# Patient Record
Sex: FEMALE | Race: WHITE | NOT HISPANIC OR LATINO | Employment: UNEMPLOYED | URBAN - METROPOLITAN AREA
[De-identification: names, ages, dates, MRNs, and addresses within clinical notes are randomized per-mention and may not be internally consistent; named-entity substitution may affect disease eponyms.]

---

## 2023-06-30 ENCOUNTER — OFFICE VISIT (OUTPATIENT)
Dept: URGENT CARE | Facility: CLINIC | Age: 43
End: 2023-06-30
Payer: MEDICAID

## 2023-06-30 VITALS
SYSTOLIC BLOOD PRESSURE: 122 MMHG | HEART RATE: 92 BPM | DIASTOLIC BLOOD PRESSURE: 76 MMHG | HEIGHT: 61 IN | OXYGEN SATURATION: 99 % | WEIGHT: 120 LBS | RESPIRATION RATE: 18 BRPM | TEMPERATURE: 99 F | BODY MASS INDEX: 22.66 KG/M2

## 2023-06-30 DIAGNOSIS — H66.92 LEFT OTITIS MEDIA, UNSPECIFIED OTITIS MEDIA TYPE: ICD-10-CM

## 2023-06-30 DIAGNOSIS — H61.23 BILATERAL IMPACTED CERUMEN: Primary | ICD-10-CM

## 2023-06-30 PROCEDURE — 99204 OFFICE O/P NEW MOD 45 MIN: CPT | Mod: 25,S$GLB,, | Performed by: NURSE PRACTITIONER

## 2023-06-30 PROCEDURE — 69210 PR REMOVAL IMPACTED CERUMEN REQUIRING INSTRUMENTATION, UNILATERAL: ICD-10-PCS | Mod: S$GLB,,, | Performed by: NURSE PRACTITIONER

## 2023-06-30 PROCEDURE — 69210 REMOVE IMPACTED EAR WAX UNI: CPT | Mod: S$GLB,,, | Performed by: NURSE PRACTITIONER

## 2023-06-30 PROCEDURE — 99204 PR OFFICE/OUTPT VISIT, NEW, LEVL IV, 45-59 MIN: ICD-10-PCS | Mod: 25,S$GLB,, | Performed by: NURSE PRACTITIONER

## 2023-06-30 RX ORDER — AMOXICILLIN AND CLAVULANATE POTASSIUM 875; 125 MG/1; MG/1
1 TABLET, FILM COATED ORAL 2 TIMES DAILY
Qty: 20 TABLET | Refills: 0 | Status: SHIPPED | OUTPATIENT
Start: 2023-06-30 | End: 2023-06-30 | Stop reason: CLARIF

## 2023-06-30 RX ORDER — NEOMYCIN SULFATE, POLYMYXIN B SULFATE, HYDROCORTISONE 3.5; 10000; 1 MG/ML; [USP'U]/ML; MG/ML
3 SOLUTION/ DROPS AURICULAR (OTIC) 3 TIMES DAILY
Qty: 10 ML | Refills: 0 | Status: SHIPPED | OUTPATIENT
Start: 2023-06-30 | End: 2023-07-10

## 2023-06-30 RX ORDER — AMOXICILLIN AND CLAVULANATE POTASSIUM 875; 125 MG/1; MG/1
1 TABLET, FILM COATED ORAL 2 TIMES DAILY
Qty: 20 TABLET | Refills: 0 | Status: SHIPPED | OUTPATIENT
Start: 2023-06-30 | End: 2023-07-10

## 2023-06-30 RX ORDER — BUPRENORPHINE HYDROCHLORIDE AND NALOXONE HYDROCHLORIDE DIHYDRATE 2; .5 MG/1; MG/1
30 TABLET SUBLINGUAL EVERY 6 HOURS PRN
COMMUNITY

## 2023-06-30 RX ORDER — NEOMYCIN SULFATE, POLYMYXIN B SULFATE, HYDROCORTISONE 3.5; 10000; 1 MG/ML; [USP'U]/ML; MG/ML
3 SOLUTION/ DROPS AURICULAR (OTIC) 3 TIMES DAILY
Qty: 10 ML | Refills: 0 | Status: SHIPPED | OUTPATIENT
Start: 2023-06-30 | End: 2023-06-30 | Stop reason: CLARIF

## 2023-06-30 NOTE — PROGRESS NOTES
"Subjective:      Patient ID: Teagan Escudero is a 43 y.o. female.    Vitals:  height is 5' 1" (1.549 m) and weight is 54.4 kg (120 lb). Her temperature is 98.7 °F (37.1 °C). Her blood pressure is 122/76 and her pulse is 92. Her respiration is 18 and oxygen saturation is 99%.     Chief Complaint: Otalgia    Pt states '' she has ear pain in her left ear and she can not hear anything out of it.''    Otalgia   There is pain in the left ear. This is a new problem. The current episode started in the past 7 days. The problem occurs constantly. The problem has been gradually worsening. The pain is at a severity of 6/10. The pain is severe. Associated symptoms include headaches, hearing loss, neck pain and a sore throat. She has tried ear drops for the symptoms. The treatment provided no relief.     HENT:  Positive for ear pain, hearing loss and sore throat.    Neck: Positive for neck pain.   Cardiovascular: Negative.    Respiratory: Negative.     Skin:  Negative for erythema.   Neurological:  Positive for headaches.    Objective:     Physical Exam   Constitutional: She is oriented to person, place, and time.  Non-toxic appearance. She does not appear ill. No distress. normal  HENT:   Head: Normocephalic.   Ears:   Right Ear: impacted cerumen  Left Ear: impacted cerumen  Nose: Nose normal.   Mouth/Throat: Mucous membranes are moist. Oropharynx is clear.   Cardiovascular: Normal rate, regular rhythm, normal heart sounds and normal pulses.   No murmur heard.Exam reveals no gallop.   Pulmonary/Chest: Effort normal and breath sounds normal. No respiratory distress. She has no wheezes. She has no rales.   Abdominal: Normal appearance. Soft. There is no abdominal tenderness.   Neurological: no focal deficit. She is alert and oriented to person, place, and time.   Skin: Skin is warm, dry, not pale and no rash. Capillary refill takes less than 2 seconds. No erythema   Psychiatric: Her behavior is normal. Mood, judgment and thought " content normal.   Nursing note and vitals reviewed.    Assessment:     1. Bilateral impacted cerumen    2. Left otitis media, unspecified otitis media type        Plan:   Bilateral ear irrigation per nursing. Right ear hearing markedly improved. Left ear TM visible after irrigation and noted to be erythematous.    Bilateral impacted cerumen  -     Ear wax removal  -     Discontinue: amoxicillin-clavulanate 875-125mg (AUGMENTIN) 875-125 mg per tablet; Take 1 tablet by mouth 2 (two) times daily. for 10 days  Dispense: 20 tablet; Refill: 0  -     Discontinue: neomycin-polymyxin-hydrocortisone (CORTISPORIN) otic solution; Place 3 drops into both ears 3 (three) times daily. for 10 days  Dispense: 10 mL; Refill: 0  -     amoxicillin-clavulanate 875-125mg (AUGMENTIN) 875-125 mg per tablet; Take 1 tablet by mouth 2 (two) times daily. for 10 days  Dispense: 20 tablet; Refill: 0  -     neomycin-polymyxin-hydrocortisone (CORTISPORIN) otic solution; Place 3 drops into both ears 3 (three) times daily. for 10 days  Dispense: 10 mL; Refill: 0    Left otitis media, unspecified otitis media type  -     Discontinue: amoxicillin-clavulanate 875-125mg (AUGMENTIN) 875-125 mg per tablet; Take 1 tablet by mouth 2 (two) times daily. for 10 days  Dispense: 20 tablet; Refill: 0  -     Discontinue: neomycin-polymyxin-hydrocortisone (CORTISPORIN) otic solution; Place 3 drops into both ears 3 (three) times daily. for 10 days  Dispense: 10 mL; Refill: 0  -     amoxicillin-clavulanate 875-125mg (AUGMENTIN) 875-125 mg per tablet; Take 1 tablet by mouth 2 (two) times daily. for 10 days  Dispense: 20 tablet; Refill: 0  -     neomycin-polymyxin-hydrocortisone (CORTISPORIN) otic solution; Place 3 drops into both ears 3 (three) times daily. for 10 days  Dispense: 10 mL; Refill: 0

## 2023-08-12 ENCOUNTER — OFFICE VISIT (OUTPATIENT)
Dept: URGENT CARE | Facility: CLINIC | Age: 43
End: 2023-08-12
Payer: MEDICAID

## 2023-08-12 VITALS
TEMPERATURE: 98 F | HEIGHT: 61 IN | OXYGEN SATURATION: 99 % | SYSTOLIC BLOOD PRESSURE: 108 MMHG | BODY MASS INDEX: 23.98 KG/M2 | DIASTOLIC BLOOD PRESSURE: 75 MMHG | HEART RATE: 82 BPM | WEIGHT: 127 LBS | RESPIRATION RATE: 18 BRPM

## 2023-08-12 DIAGNOSIS — N39.0 URINARY TRACT INFECTION WITHOUT HEMATURIA, SITE UNSPECIFIED: ICD-10-CM

## 2023-08-12 DIAGNOSIS — R35.0 FREQUENCY OF URINATION: ICD-10-CM

## 2023-08-12 DIAGNOSIS — N89.8 VAGINAL DISCHARGE: Primary | ICD-10-CM

## 2023-08-12 DIAGNOSIS — R39.15 URGENCY OF URINATION: ICD-10-CM

## 2023-08-12 LAB
B-HCG UR QL: NEGATIVE
BILIRUB UR QL STRIP: NEGATIVE
CTP QC/QA: YES
GLUCOSE UR QL STRIP: NEGATIVE
KETONES UR QL STRIP: NEGATIVE
LEUKOCYTE ESTERASE UR QL STRIP: POSITIVE
PH, POC UA: 6
POC BLOOD, URINE: POSITIVE
POC NITRATES, URINE: NEGATIVE
PROT UR QL STRIP: NEGATIVE
SP GR UR STRIP: 1.02 (ref 1–1.03)
UROBILINOGEN UR STRIP-ACNC: 0.2 (ref 0.1–1.1)

## 2023-08-12 PROCEDURE — 81003 POCT URINALYSIS, DIPSTICK, AUTOMATED, W/O SCOPE: ICD-10-PCS | Mod: QW,S$GLB,, | Performed by: NURSE PRACTITIONER

## 2023-08-12 PROCEDURE — 99214 OFFICE O/P EST MOD 30 MIN: CPT | Mod: S$GLB,,, | Performed by: NURSE PRACTITIONER

## 2023-08-12 PROCEDURE — 81025 URINE PREGNANCY TEST: CPT | Mod: S$GLB,,, | Performed by: NURSE PRACTITIONER

## 2023-08-12 PROCEDURE — 81025 POCT URINE PREGNANCY: ICD-10-PCS | Mod: S$GLB,,, | Performed by: NURSE PRACTITIONER

## 2023-08-12 PROCEDURE — 81003 URINALYSIS AUTO W/O SCOPE: CPT | Mod: QW,S$GLB,, | Performed by: NURSE PRACTITIONER

## 2023-08-12 PROCEDURE — 99214 PR OFFICE/OUTPT VISIT, EST, LEVL IV, 30-39 MIN: ICD-10-PCS | Mod: S$GLB,,, | Performed by: NURSE PRACTITIONER

## 2023-08-12 RX ORDER — CEFADROXIL 500 MG/1
500 CAPSULE ORAL EVERY 12 HOURS
Qty: 14 CAPSULE | Refills: 0 | Status: SHIPPED | OUTPATIENT
Start: 2023-08-12 | End: 2023-08-19

## 2023-08-12 NOTE — PROGRESS NOTES
"Subjective:      Patient ID: Teagan Escudero is a 43 y.o. female.    Vitals:  height is 5' 1" (1.549 m) and weight is 57.6 kg (127 lb). Her oral temperature is 97.9 °F (36.6 °C). Her blood pressure is 108/75 and her pulse is 82. Her respiration is 18 and oxygen saturation is 99%.     Chief Complaint: Urinary Tract Infection and Vaginal Discharge    Pt states that her symptoms started 2 days ago. Patient states that her symptoms are the following: discharge yellowish, trouble urinating, abdominal pain, frequency, urgency, slight back pain. Patient states that [place] drug court and cannot have any type of narcotics. Patient denies any other symptoms. Last UTi was greater than 1 year ago.      Constitution: Negative for chills, fatigue and fever.   Gastrointestinal:  Negative for abdominal pain, hemorrhoids and heartburn.   Genitourinary:  Positive for dysuria, frequency, urgency, hematuria, vaginal discharge and pelvic pain. Negative for flank pain.      Objective:     Physical Exam   Constitutional: She is oriented to person, place, and time. No distress.   HENT:   Head: Normocephalic and atraumatic.   Cardiovascular: Normal rate.   Pulmonary/Chest: Effort normal. No respiratory distress.   Abdominal: Normal appearance. There is abdominal tenderness in the suprapubic area. There is no left CVA tenderness and no right CVA tenderness.   Neurological: She is alert and oriented to person, place, and time.   Psychiatric: Her behavior is normal. Mood normal.       Assessment:     1. Vaginal discharge    2. Frequency of urination    3. Urgency of urination    4. Urinary tract infection without hematuria, site unspecified        Plan:   UA shows blood and leukocytes, upt negative. Nu swab pending, urine culture pending. Discussed with patient. Treat as below. Follow up with PCP if symptoms are not resolving in 48-72 hours, follow up immediately for new or worsening symptoms, ED precautions discussed.      Vaginal " discharge  -     POCT Urinalysis, Dipstick, Automated, W/O Scope  -     POCT urine pregnancy  -     Nuab Vaginitis Plus (VG+)    Frequency of urination  -     POCT Urinalysis, Dipstick, Automated, W/O Scope  -     POCT urine pregnancy    Urgency of urination  -     POCT Urinalysis, Dipstick, Automated, W/O Scope  -     POCT urine pregnancy    Urinary tract infection without hematuria, site unspecified  -     CULTURE, URINE    Other orders  -     cefadroxil (DURICEF) 500 MG Cap; Take 1 capsule (500 mg total) by mouth every 12 (twelve) hours. for 7 days  Dispense: 14 capsule; Refill: 0

## 2023-08-17 LAB
A VAGINAE DNA VAG QL NAA+PROBE: ABNORMAL SCORE
BVAB2 DNA VAG QL NAA+PROBE: ABNORMAL SCORE
C ALBICANS DNA VAG QL NAA+PROBE: NEGATIVE
C GLABRATA DNA VAG QL NAA+PROBE: NEGATIVE
C TRACH DNA VAG QL NAA+PROBE: NEGATIVE
MEGA1 DNA VAG QL NAA+PROBE: ABNORMAL SCORE
N GONORRHOEA DNA VAG QL NAA+PROBE: NEGATIVE
T VAGINALIS DNA VAG QL NAA+PROBE: NEGATIVE

## 2023-08-18 DIAGNOSIS — N76.0 BACTERIAL VAGINOSIS: Primary | ICD-10-CM

## 2023-08-18 DIAGNOSIS — B96.89 BACTERIAL VAGINOSIS: Primary | ICD-10-CM

## 2023-08-18 RX ORDER — METRONIDAZOLE 7.5 MG/G
1 GEL VAGINAL NIGHTLY
Qty: 70 G | Refills: 0 | Status: SHIPPED | OUTPATIENT
Start: 2023-08-18 | End: 2023-08-23

## 2023-08-19 LAB
BACTERIA UR CULT: ABNORMAL
BACTERIA UR CULT: ABNORMAL

## 2023-08-20 ENCOUNTER — OFFICE VISIT (OUTPATIENT)
Dept: URGENT CARE | Facility: CLINIC | Age: 43
End: 2023-08-20
Payer: MEDICAID

## 2023-08-20 VITALS
HEART RATE: 78 BPM | DIASTOLIC BLOOD PRESSURE: 76 MMHG | OXYGEN SATURATION: 98 % | HEIGHT: 61 IN | BODY MASS INDEX: 23.79 KG/M2 | TEMPERATURE: 97 F | RESPIRATION RATE: 16 BRPM | WEIGHT: 126 LBS | SYSTOLIC BLOOD PRESSURE: 110 MMHG

## 2023-08-20 DIAGNOSIS — R39.9 UTI SYMPTOMS: ICD-10-CM

## 2023-08-20 DIAGNOSIS — R82.79 POSITIVE URINE CULTURE: ICD-10-CM

## 2023-08-20 DIAGNOSIS — Z16.20 MICROBIAL RESISTANCE TO ANTIBIOTIC: ICD-10-CM

## 2023-08-20 DIAGNOSIS — N89.8 VAGINAL DISCHARGE: ICD-10-CM

## 2023-08-20 DIAGNOSIS — R30.0 DYSURIA: Primary | ICD-10-CM

## 2023-08-20 DIAGNOSIS — M25.561 ACUTE PAIN OF RIGHT KNEE: ICD-10-CM

## 2023-08-20 DIAGNOSIS — R31.29 MICROSCOPIC HEMATURIA: ICD-10-CM

## 2023-08-20 LAB
B-HCG UR QL: NEGATIVE
BILIRUB UR QL STRIP: NEGATIVE
CTP QC/QA: YES
GLUCOSE UR QL STRIP: NEGATIVE
KETONES UR QL STRIP: POSITIVE
LEUKOCYTE ESTERASE UR QL STRIP: NEGATIVE
PH, POC UA: 6
POC BLOOD, URINE: POSITIVE
POC NITRATES, URINE: NEGATIVE
PROT UR QL STRIP: NEGATIVE
SP GR UR STRIP: 1.02 (ref 1–1.03)
UROBILINOGEN UR STRIP-ACNC: NORMAL (ref 0.1–1.1)

## 2023-08-20 PROCEDURE — 81003 POCT URINALYSIS, DIPSTICK, AUTOMATED, W/O SCOPE: ICD-10-PCS | Mod: QW,S$GLB,, | Performed by: NURSE PRACTITIONER

## 2023-08-20 PROCEDURE — 99214 OFFICE O/P EST MOD 30 MIN: CPT | Mod: S$GLB,,, | Performed by: NURSE PRACTITIONER

## 2023-08-20 PROCEDURE — 81003 URINALYSIS AUTO W/O SCOPE: CPT | Mod: QW,S$GLB,, | Performed by: NURSE PRACTITIONER

## 2023-08-20 PROCEDURE — 99214 PR OFFICE/OUTPT VISIT, EST, LEVL IV, 30-39 MIN: ICD-10-PCS | Mod: S$GLB,,, | Performed by: NURSE PRACTITIONER

## 2023-08-20 PROCEDURE — 81025 URINE PREGNANCY TEST: CPT | Mod: S$GLB,,, | Performed by: NURSE PRACTITIONER

## 2023-08-20 PROCEDURE — 81025 POCT URINE PREGNANCY: ICD-10-PCS | Mod: S$GLB,,, | Performed by: NURSE PRACTITIONER

## 2023-08-20 RX ORDER — DIVALPROEX SODIUM 125 MG/1
TABLET, DELAYED RELEASE ORAL
COMMUNITY
Start: 2023-06-02

## 2023-08-20 RX ORDER — CLONIDINE HYDROCHLORIDE 0.1 MG/1
0.1 TABLET ORAL NIGHTLY
COMMUNITY
Start: 2023-07-26

## 2023-08-20 RX ORDER — CLINDAMYCIN HYDROCHLORIDE 300 MG/1
300 CAPSULE ORAL EVERY 6 HOURS
Qty: 28 CAPSULE | Refills: 0 | Status: SHIPPED | OUTPATIENT
Start: 2023-08-20 | End: 2023-08-27

## 2023-08-20 NOTE — LETTER
August 20, 2023      [facility]  [address]  [facility] [address]  Phone: [phone]       Patient: Teagan Escudero   YOB: 1980  Date of Visit: 08/20/2023    To Whom It May Concern:    Lennox Escudero  was at [facility] on 08/20/2023. The patient may return to work/school on 08/25/2023  with no restrictions. If you have any questions or concerns, or if I can be of further assistance, please do not hesitate to contact me.    Sincerely,    Lynda Morton, NP

## 2023-08-20 NOTE — PATIENT INSTRUCTIONS
Go directly over to the [facility] which is now called [facility].  Go to the outpatient registration which is located just to the right-hand side of the emergency department entrance.  Let them know that you were there for x-rays as an outpatient that you do not need to be seen in the emergency department.  Once x-rays are done you can go home and I will call you later in the day with the results.    As we discussed you will need to look on your insurance card call the number or look on the website to obtain an orthopedic in network on your plan to schedule an appointment for close follow-up.    As we discussed it is important for you to limit your activity not to aggravate symptoms, keep the leg elevated as often as possible, support the knee with a brace that we discussed.  Ice to the affected area when flared up.  Anti-inflammatories or NSAIDs such as ibuprofen or Alleve over-the-counter as directed for pain/inflammation.    Vaginal swab collected today will be sent out to the lab for analysis, someone should be calling you with results.    Clindamycin 4 times a day for 7 days.  This antibiotic should be enough to manage the urinary tract infection and also the bacterial vaginosis if that is still found to be the cause of your vaginal discharge which will be confirmed by the vaginal swab results.    It is always advisable to take probiotics for intestinal health over-the-counter as directed when taking antibiotic especially when taking clindamycin

## 2023-08-20 NOTE — PROGRESS NOTES
"Subjective:      Patient ID: Teagan Escudero is a 43 y.o. female.    Vitals:  height is 5' 1" (1.549 m) and weight is 57.2 kg (126 lb). Her temperature is 97 °F (36.1 °C). Her blood pressure is 110/76 and her pulse is 78. Her respiration is 16 and oxygen saturation is 98%.     Chief Complaint: Dysuria    Pt states she came in last week & had UTI, states she is still having dysuria & still having a bad discharge states finished all medications that were prescribed. Also states hit right knee & it has been swollen & hurts to walk.     Attempted to call patient yesterday related to positive urine culture.  Phone number on file is not her Correct number.  Patient informed of this and is going to correct her number at the .  Urine culture did not have sensitivity to the antibiotic she was prescribed at clinic visit.  Continues to have urinary symptoms        Constitution: Negative for chills and fever.   Gastrointestinal:  Negative for abdominal pain, nausea, vomiting and diarrhea.   Genitourinary:  Positive for dysuria, frequency, vaginal discharge and vaginal odor. Negative for flank pain, hematuria and pelvic pain.   Musculoskeletal:  Positive for joint pain (Right knee times 1 week.  Denies injury or trauma) and joint swelling. Negative for trauma, abnormal ROM of joint, arthritis and back pain.   Skin:  Negative for erythema.      Objective:     Physical Exam   Constitutional: She is oriented to person, place, and time.  Non-toxic appearance. She does not appear ill. No distress.   HENT:   Head: Normocephalic and atraumatic.   Nose: Nose normal.   Mouth/Throat: Mucous membranes are moist.   Eyes: Conjunctivae are normal.   Cardiovascular: Normal rate.   Pulmonary/Chest: Effort normal. No respiratory distress.   Abdominal: Normal appearance. Soft. flat abdomen There is no abdominal tenderness. There is no left CVA tenderness and no right CVA tenderness.   Musculoskeletal: Normal range of motion.         " General: Tenderness present. No swelling, deformity or signs of injury. Normal range of motion.      Right knee: She exhibits normal range of motion, no swelling, no effusion, no ecchymosis, no deformity, no erythema, normal alignment, normal patellar mobility, no bony tenderness and normal meniscus. Tenderness found.        Legs:    Neurological: no focal deficit. She is alert and oriented to person, place, and time.   Skin: Skin is warm, dry and no rash. Capillary refill takes 2 to 3 seconds. No bruising, No erythema and No lesion   Psychiatric: Her behavior is normal. Mood normal.   Nursing note and vitals reviewed.      Assessment:     1. Dysuria    2. Microscopic hematuria    3. UTI symptoms    4. Vaginal discharge    5. Acute pain of right knee    6. 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  Negative WBCs negative nitrites  UPT negative    Nuswab pending  Plan:       Dysuria  -     POCT Urinalysis, Dipstick, Automated, W/O Scope  -     POCT urine pregnancy    Microscopic hematuria  -     clindamycin (CLEOCIN) 300 MG capsule; Take 1 capsule (300 mg total) by mouth every 6 (six) hours. for 7 days  Dispense: 28 capsule; Refill: 0    UTI symptoms  -     clindamycin (CLEOCIN) 300 MG capsule; Take 1 capsule (300 mg total) by mouth every 6 (six) hours. for 7 days  Dispense: 28 capsule; Refill: 0    Vaginal discharge  -     NuSwab Vaginitis Plus (VG+)  -     clindamycin (CLEOCIN) 300 MG capsule; Take 1 capsule (300 mg total) by mouth every 6 (six) hours. for 7 days  Dispense: 28 capsule; Refill: 0    Acute pain of right knee  -     XR KNEE 3 VIEW RIGHT; Future; Expected date: 08/20/2023    Positive urine culture  -     clindamycin (CLEOCIN) 300 MG capsule; Take 1 capsule (300 mg total) by mouth every 6 (six) hours. 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 for 7 days  Dispense: 28 capsule; Refill: 0    Will go ahead and treat with clindamycin to cover urine culture resistance and if it new swab returns with continued BV after completing course of Flagyl then clindamycin would be the alternate choice for adequate treatment of that as well

## 2023-08-21 ENCOUNTER — TELEPHONE (OUTPATIENT)
Dept: URGENT CARE | Facility: CLINIC | Age: 43
End: 2023-08-21
Payer: MEDICAID

## 2023-08-21 ENCOUNTER — HOSPITAL ENCOUNTER (OUTPATIENT)
Dept: RADIOLOGY | Facility: HOSPITAL | Age: 43
Discharge: HOME | End: 2023-08-21
Attending: NURSE PRACTITIONER
Payer: MEDICAID

## 2023-08-21 DIAGNOSIS — M25.561 ACUTE PAIN OF RIGHT KNEE: ICD-10-CM

## 2023-08-21 PROCEDURE — 73562 X-RAY EXAM OF KNEE 3: CPT | Mod: 26,RT,, | Performed by: RADIOLOGY

## 2023-08-21 PROCEDURE — 73562 XR KNEE 3 VIEW RIGHT: ICD-10-PCS | Mod: 26,RT,, | Performed by: RADIOLOGY

## 2023-08-21 PROCEDURE — 73562 X-RAY EXAM OF KNEE 3: CPT | Mod: TC,RT

## 2023-08-21 NOTE — TELEPHONE ENCOUNTER
----- Message from Cynthia Dominguez NP sent at 8/18/2023  8:08 AM CDT -----  Patient's Nuswab+ came back showing bacterial vaginosis. Please inform her I will sent metronidazole vaginal gel 0.75% to the pharmacy on file and she will need to pick it up and start as soon as possible.

## 2023-09-11 ENCOUNTER — TELEPHONE (OUTPATIENT)
Dept: URGENT CARE | Facility: CLINIC | Age: 43
End: 2023-09-11
Payer: MEDICAID

## 2023-09-11 NOTE — TELEPHONE ENCOUNTER
----- Message from Lynda Morton NP sent at 8/24/2023 11:54 AM CDT -----  Please notify that vaginal swab came back negative for yeast, GC, chlamydia, and trich.  However, it was positive for BV.  Prescription for clindamycin that was prescribed the day of clinic visit should be adequate to treat

## 2023-10-29 ENCOUNTER — OFFICE VISIT (OUTPATIENT)
Dept: URGENT CARE | Facility: CLINIC | Age: 43
End: 2023-10-29
Payer: MEDICAID

## 2023-10-29 VITALS
BODY MASS INDEX: 24.92 KG/M2 | OXYGEN SATURATION: 98 % | DIASTOLIC BLOOD PRESSURE: 79 MMHG | HEIGHT: 61 IN | HEART RATE: 76 BPM | WEIGHT: 132 LBS | TEMPERATURE: 98 F | SYSTOLIC BLOOD PRESSURE: 112 MMHG

## 2023-10-29 DIAGNOSIS — S89.91XD KNEE INJURY, RIGHT, SUBSEQUENT ENCOUNTER: ICD-10-CM

## 2023-10-29 DIAGNOSIS — M25.561 LATERAL KNEE PAIN, RIGHT: Primary | ICD-10-CM

## 2023-10-29 PROCEDURE — 99214 OFFICE O/P EST MOD 30 MIN: CPT | Mod: S$GLB,,,

## 2023-10-29 PROCEDURE — 99214 PR OFFICE/OUTPT VISIT, EST, LEVL IV, 30-39 MIN: ICD-10-PCS | Mod: S$GLB,,,

## 2023-10-29 RX ORDER — KETOROLAC TROMETHAMINE 30 MG/ML
30 INJECTION, SOLUTION INTRAMUSCULAR; INTRAVENOUS
Status: COMPLETED | OUTPATIENT
Start: 2023-10-29 | End: 2023-10-29

## 2023-10-29 RX ADMIN — KETOROLAC TROMETHAMINE 30 MG: 30 INJECTION, SOLUTION INTRAMUSCULAR; INTRAVENOUS at 02:10

## 2023-10-29 NOTE — LETTER
October 29, 2023      [facility]  [address]  [facility] [address]  Phone: [phone]       Patient: Teagan Escudero   YOB: 1980  Date of Visit: 10/29/2023    To Whom It May Concern:    Lennox Escudero  was at [facility] on 10/29/2023. The patient may return to work on November 1, 2023 with no restrictions. If you have any questions or concerns, or if I can be of further assistance, please do not hesitate to contact me.    Sincerely,    Cynthia Dominguez NP

## 2023-10-29 NOTE — PROGRESS NOTES
"Subjective:      Patient ID: Teagan Escudero is a 43 y.o. female.    Vitals:  height is 5' 1" (1.549 m) and weight is 59.9 kg (132 lb). Her oral temperature is 98 °F (36.7 °C). Her blood pressure is 112/79 and her pulse is 76. Her oxygen saturation is 98%.     Chief Complaint: Knee Pain    Patient was seen of the end of August after injuring her right knee.  She states she was mowing the lawn and stepped in a hole and twisted the right knee.  She had x-rays done at the time of initial visit on August 20th and there were no findings.  She follows direction and purchased a knee brace and has been wearing it but she reports the pain has just continued to worsen and she has swelling around the right knee.  Pain is on the lateral side and increases with movement and while walking up stairs.  She is been taking Tylenol for the pain and reports minimal improvement.  She also reports she feels like her knee gives out when walking occasionally.    Knee Pain         Constitution: Negative.   HENT: Negative.     Neck: neck negative.   Cardiovascular: Negative.    Gastrointestinal: Negative.    Musculoskeletal:  Positive for joint pain, joint swelling and abnormal ROM of joint.        Right knee   Skin:  Negative for erythema and bruising.      Objective:     Physical Exam   Constitutional: She is oriented to person, place, and time. No distress. normal  HENT:   Head: Normocephalic and atraumatic.   Ears:   Right Ear: External ear normal.   Left Ear: External ear normal.   Nose: Nose normal.   Mouth/Throat: Mucous membranes are moist. Oropharynx is clear.   Eyes: Conjunctivae are normal.   Cardiovascular: Normal rate.   Pulmonary/Chest: Effort normal. No respiratory distress.   Abdominal: Normal appearance.   Musculoskeletal:         General: Swelling, tenderness and signs of injury (Initial injury in August) present. No deformity.   Neurological: She is alert and oriented to person, place, and time. She displays no weakness. "   Skin: Skin is warm and dry. Capillary refill takes less than 2 seconds. No bruising and No erythema   Psychiatric: Her behavior is normal. Mood, judgment and thought content normal.   Nursing note and vitals reviewed.      Assessment:     1. Lateral knee pain, right    2. Knee injury, right, subsequent encounter        Plan:       Lateral knee pain, right  -     ketorolac injection 30 mg  -     Ambulatory referral/consult to Orthopedics  -     MRI Knee Without Contrast Right; Future; Expected date: 10/29/2023    Knee injury, right, subsequent encounter  -     Ambulatory referral/consult to Orthopedics  -     MRI Knee Without Contrast Right; Future; Expected date: 10/29/2023      Discussed with patient I will refer her to Orthopedics and order an outpatient MRI hopefully to be completed prior to orthopedic appointment.  Discussed with patient I will give her a Toradol shot in clinic today and she can resume taking NSAIDs tomorrow along with her Tylenol.  Continue to wear knee brace as previously recommended whenever ambulating.  Patient requesting a day or 2 off of work due to pain.  Discussed this is fine and she should continue to RICE the knee.

## 2023-10-29 NOTE — LETTER
October 29, 2023      [facility]  [address]  [facility] [address]  Phone: [phone]       Patient: Teagan Escudero   YOB: 1980  Date of Visit: 10/29/2023    To Whom It May Concern:    Lennox Escudero  was at [facility] on 10/29/2023.  At time of visit, I deemed it necessary to give her a shot of Toradol which is an NSAID.  She is requested a letter explaining the necessity of this medication.  She was seen for a knee injury.    Sincerely,    Cynthia Dominguez NP

## 2023-10-29 NOTE — PATIENT INSTRUCTIONS
As discussed please continue to wear the knee brace whenever walking or working.    Alternate ibuprofen and Tylenol for pain    Rest the knee as much as possible and while resting make sure you elevate the knee on pillows, use ice for reduction of swelling, use compression such as Ace wrap or knee brace.     You should be receiving a call early this week to set up an appointment Orthopedics and your MRI    To schedule an appointment, call [phone] or visit [facility]

## 2023-10-29 NOTE — PROGRESS NOTES
"Subjective:      Patient ID: Teagan Escudero is a 43 y.o. female.    Vitals:  height is 5' 1" (1.549 m) and weight is 59.9 kg (132 lb). Her oral temperature is 98 °F (36.7 °C). Her blood pressure is 112/79 and her pulse is 76. Her oxygen saturation is 98%.     Chief Complaint: No chief complaint on file.    Follow-up  This is a recurrent problem. The current episode started today. The problem occurs constantly. The symptoms are aggravated by standing, twisting and walking. She has tried acetaminophen for the symptoms. The treatment provided no relief.   ROS   Objective:     Physical Exam    Assessment:     No diagnosis found.    Plan:       There are no diagnoses linked to this encounter.                "

## 2023-11-09 ENCOUNTER — HOSPITAL ENCOUNTER (OUTPATIENT)
Dept: RADIOLOGY | Facility: HOSPITAL | Age: 43
Discharge: HOME | End: 2023-11-09
Payer: MEDICAID

## 2023-11-09 DIAGNOSIS — S89.91XD KNEE INJURY, RIGHT, SUBSEQUENT ENCOUNTER: ICD-10-CM

## 2023-11-09 DIAGNOSIS — M25.561 LATERAL KNEE PAIN, RIGHT: ICD-10-CM

## 2023-11-09 PROCEDURE — 73721 MRI JNT OF LWR EXTRE W/O DYE: CPT | Mod: 26,RT,, | Performed by: RADIOLOGY

## 2023-11-09 PROCEDURE — 73721 MRI JNT OF LWR EXTRE W/O DYE: CPT | Mod: TC,RT

## 2023-11-09 PROCEDURE — 73721 MRI KNEE WITHOUT CONTRAST RIGHT: ICD-10-PCS | Mod: 26,RT,, | Performed by: RADIOLOGY

## 2024-01-11 ENCOUNTER — OFFICE VISIT (OUTPATIENT)
Dept: URGENT CARE | Facility: CLINIC | Age: 44
End: 2024-01-11
Payer: MEDICAID

## 2024-01-11 VITALS
WEIGHT: 132 LBS | RESPIRATION RATE: 16 BRPM | TEMPERATURE: 98 F | DIASTOLIC BLOOD PRESSURE: 68 MMHG | HEART RATE: 73 BPM | HEIGHT: 61 IN | SYSTOLIC BLOOD PRESSURE: 96 MMHG | OXYGEN SATURATION: 97 % | BODY MASS INDEX: 24.92 KG/M2

## 2024-01-11 DIAGNOSIS — R51.9 HEADACHE AROUND THE EYES: ICD-10-CM

## 2024-01-11 DIAGNOSIS — R09.81 SINUS CONGESTION: ICD-10-CM

## 2024-01-11 DIAGNOSIS — J32.9 RHINOSINUSITIS: Primary | ICD-10-CM

## 2024-01-11 LAB
CTP QC/QA: YES
SARS-COV-2 AG RESP QL IA.RAPID: NEGATIVE

## 2024-01-11 PROCEDURE — 99213 OFFICE O/P EST LOW 20 MIN: CPT | Mod: S$GLB,,,

## 2024-01-11 PROCEDURE — 87811 SARS-COV-2 COVID19 W/OPTIC: CPT | Mod: QW,S$GLB,,

## 2024-01-11 RX ORDER — LEVOCETIRIZINE DIHYDROCHLORIDE 5 MG/1
5 TABLET, FILM COATED ORAL NIGHTLY
Qty: 30 TABLET | Refills: 0 | Status: SHIPPED | OUTPATIENT
Start: 2024-01-11

## 2024-01-11 RX ORDER — FLUTICASONE PROPIONATE 50 MCG
1 SPRAY, SUSPENSION (ML) NASAL DAILY
Qty: 15.8 ML | Refills: 0 | Status: SHIPPED | OUTPATIENT
Start: 2024-01-11

## 2024-01-12 NOTE — PATIENT INSTRUCTIONS
Symptomatic treatment to include:     Rest, increase fluid intake to include electrolyte replacement  Ibuprofen/Tylenol as directed for fever, sore throat, body aches  Xyzal and flonase for sinus symptoms    Mucinex over the counter as directed for sinus congestion.   Warm, salt water gargles, over the counter throat lozenges or sprays as desires.   ER for difficulty breathing not relieved by rest, excessive lethargy and/or change in mental status

## 2024-01-12 NOTE — PROGRESS NOTES
"Subjective:      Patient ID: Teagan Escudero is a 43 y.o. female.    Vitals:  height is 5' 1" (1.549 m) and weight is 59.9 kg (132 lb). Her oral temperature is 98.2 °F (36.8 °C). Her blood pressure is 96/68 and her pulse is 73. Her respiration is 16 and oxygen saturation is 97%.     Chief Complaint: Sinus Problem    Sinus pressure and headache that began today.  Exposure to covid.   Patient requesting covid test only.    Sinus Problem  This is a new problem. The current episode started today. Associated symptoms include congestion, headaches and sinus pressure. Past treatments include acetaminophen.       Constitution: Negative.   HENT:  Positive for congestion, postnasal drip and sinus pressure.    Neck: neck negative.   Cardiovascular: Negative.    Respiratory: Negative.     Gastrointestinal: Negative.    Musculoskeletal: Negative.    Skin: Negative.    Neurological:  Positive for headaches.   Psychiatric/Behavioral: Negative.        Objective:     Physical Exam   Constitutional: She is oriented to person, place, and time. She appears well-developed. She is cooperative.  Non-toxic appearance. She does not appear ill. No distress.   HENT:   Head: Normocephalic and atraumatic.   Ears:   Right Ear: Hearing and external ear normal.   Left Ear: Hearing and external ear normal.   Nose: Rhinorrhea and congestion present. No mucosal edema or nasal deformity. No epistaxis. Right sinus exhibits no maxillary sinus tenderness and no frontal sinus tenderness. Left sinus exhibits no maxillary sinus tenderness and no frontal sinus tenderness.   Mouth/Throat: Uvula is midline, oropharynx is clear and moist and mucous membranes are normal. Mucous membranes are moist. No trismus in the jaw. Normal dentition. No uvula swelling. No posterior oropharyngeal edema.   Eyes: Conjunctivae and lids are normal. No scleral icterus.   Neck: Trachea normal and phonation normal. Neck supple. No edema present. No erythema present. No neck rigidity " present.   Cardiovascular: Normal rate, regular rhythm and normal heart sounds.   Pulmonary/Chest: Effort normal and breath sounds normal. No respiratory distress. She has no decreased breath sounds. She has no wheezes. She has no rhonchi.   Abdominal: Normal appearance.   Musculoskeletal: Normal range of motion.         General: No deformity. Normal range of motion.   Neurological: She is alert and oriented to person, place, and time. She displays no weakness. She exhibits normal muscle tone.   Skin: Skin is warm, dry, intact, not diaphoretic and not pale.   Psychiatric: Her speech is normal and behavior is normal. Mood, judgment and thought content normal.   Nursing note and vitals reviewed.      Assessment:     1. Rhinosinusitis    2. Headache around the eyes    3. Sinus congestion        Plan:       Rhinosinusitis  -     levocetirizine (XYZAL) 5 MG tablet; Take 1 tablet (5 mg total) by mouth every evening.  Dispense: 30 tablet; Refill: 0  -     fluticasone propionate (FLONASE) 50 mcg/actuation nasal spray; 1 spray (50 mcg total) by Each Nostril route once daily.  Dispense: 15.8 mL; Refill: 0    Headache around the eyes  -     SARS Coronavirus 2 Antigen, POCT Manual Read    Sinus congestion      COVID: neg    Discussed medication with patient who acknowledges understanding and is agreeable to POC. Follow up with primary care. Increase fluid intake. Red flags for ER discussed.  Retest if symptoms progress.

## 2024-04-29 ENCOUNTER — OFFICE VISIT (OUTPATIENT)
Dept: URGENT CARE | Facility: CLINIC | Age: 44
End: 2024-04-29
Payer: MEDICAID

## 2024-04-29 VITALS
HEART RATE: 85 BPM | TEMPERATURE: 99 F | WEIGHT: 142 LBS | BODY MASS INDEX: 26.81 KG/M2 | OXYGEN SATURATION: 98 % | SYSTOLIC BLOOD PRESSURE: 110 MMHG | HEIGHT: 61 IN | DIASTOLIC BLOOD PRESSURE: 74 MMHG | RESPIRATION RATE: 16 BRPM

## 2024-04-29 DIAGNOSIS — N30.91 CYSTITIS WITH HEMATURIA: Primary | ICD-10-CM

## 2024-04-29 DIAGNOSIS — R30.0 DYSURIA: ICD-10-CM

## 2024-04-29 LAB
BILIRUB UR QL STRIP: POSITIVE
GLUCOSE UR QL STRIP: NEGATIVE
KETONES UR QL STRIP: POSITIVE
LEUKOCYTE ESTERASE UR QL STRIP: POSITIVE
PH, POC UA: 5.5
POC BLOOD, URINE: POSITIVE
POC NITRATES, URINE: POSITIVE
PROT UR QL STRIP: POSITIVE
SP GR UR STRIP: 1.02 (ref 1–1.03)
UROBILINOGEN UR STRIP-ACNC: 1 (ref 0.1–1.1)

## 2024-04-29 PROCEDURE — 81003 URINALYSIS AUTO W/O SCOPE: CPT | Mod: QW,S$GLB,, | Performed by: NURSE PRACTITIONER

## 2024-04-29 PROCEDURE — 99213 OFFICE O/P EST LOW 20 MIN: CPT | Mod: S$GLB,,, | Performed by: NURSE PRACTITIONER

## 2024-04-29 RX ORDER — PHENAZOPYRIDINE HYDROCHLORIDE 100 MG/1
100 TABLET, FILM COATED ORAL 3 TIMES DAILY PRN
Qty: 6 TABLET | Refills: 0 | Status: SHIPPED | OUTPATIENT
Start: 2024-04-29 | End: 2024-05-01

## 2024-04-29 RX ORDER — CEPHALEXIN 500 MG/1
500 CAPSULE ORAL EVERY 12 HOURS
Qty: 14 CAPSULE | Refills: 0 | Status: SHIPPED | OUTPATIENT
Start: 2024-04-29 | End: 2024-05-06

## 2024-04-29 RX ORDER — BUPRENORPHINE HYDROCHLORIDE, NALOXONE HYDROCHLORIDE 8; 2 MG/1; MG/1
FILM, SOLUBLE BUCCAL; SUBLINGUAL 2 TIMES DAILY
COMMUNITY
Start: 2024-02-27

## 2024-04-29 NOTE — PROGRESS NOTES
"Subjective:      Patient ID: Teagan Escudero is a 43 y.o. female.    Vitals:  height is 5' 1" (1.549 m) and weight is 64.4 kg (142 lb). Her temperature is 98.5 °F (36.9 °C). Her blood pressure is 110/74 and her pulse is 85. Her respiration is 16 and oxygen saturation is 98%.     Chief Complaint: Dysuria    Dysuria   This is a new problem. The current episode started in the past 7 days (x's 3 days). The problem has been waxing and waning. The quality of the pain is described as burning. Associated symptoms include flank pain, frequency, hesitancy and urgency. Pertinent negatives include no chills, nausea, vomiting or rash. Treatments tried: AZO. The treatment provided mild relief.       Constitution: Negative for chills and fever.   HENT:  Negative for congestion.    Cardiovascular:  Negative for chest pain, palpitations and sob on exertion.   Respiratory:  Negative for cough and shortness of breath.    Gastrointestinal:  Negative for abdominal pain, nausea and vomiting.   Genitourinary:  Positive for dysuria, frequency, urgency and flank pain.   Musculoskeletal:  Negative for back pain.   Skin:  Negative for rash.   Neurological:  Negative for dizziness, light-headedness, passing out, disorientation and altered mental status.   Psychiatric/Behavioral:  Negative for altered mental status, disorientation and confusion.       Objective:     Physical Exam   Constitutional: She is oriented to person, place, and time. She appears well-developed. She is cooperative.  Non-toxic appearance. She does not appear ill. No distress.   HENT:   Head: Normocephalic and atraumatic.   Ears:   Right Ear: External ear normal.   Left Ear: External ear normal.   Nose: Nose normal.   Mouth/Throat: Oropharynx is clear and moist and mucous membranes are normal. Mucous membranes are moist.   Eyes: Conjunctivae and lids are normal. No scleral icterus.   Neck: Trachea normal and phonation normal. Neck supple.   Cardiovascular: Normal rate, " regular rhythm, normal heart sounds and normal pulses.   Pulmonary/Chest: Effort normal and breath sounds normal. No stridor. No respiratory distress.   Abdominal: Normal appearance and bowel sounds are normal. She exhibits no distension and no mass. Soft. flat abdomen There is no abdominal tenderness. There is no left CVA tenderness and no right CVA tenderness.   Musculoskeletal:         General: No deformity.   Neurological: no focal deficit. She is alert and oriented to person, place, and time. She has normal strength and normal reflexes. No sensory deficit.   Skin: Skin is warm, dry, intact and not diaphoretic. Capillary refill takes 2 to 3 seconds.   Psychiatric: Her speech is normal and behavior is normal. Judgment and thought content normal.   Nursing note and vitals reviewed.      Assessment:     1. Cystitis with hematuria    2. Dysuria        Plan:       Cystitis with hematuria  -     cephALEXin (KEFLEX) 500 MG capsule; Take 1 capsule (500 mg total) by mouth every 12 (twelve) hours. for 7 days  Dispense: 14 capsule; Refill: 0  -     phenazopyridine (PYRIDIUM) 100 MG tablet; Take 1 tablet (100 mg total) by mouth 3 (three) times daily as needed for Pain.  Dispense: 6 tablet; Refill: 0    Dysuria  -     POCT Urinalysis, Dipstick, Automated, W/O Scope      Urinalysis-leukocyte, blood, nitrate, and protein positive.    The test results and physical exam findings were discussed with the patient and all questions answered. We discussed symptom monitoring, conservative care methods, medication use, and follow up orders. he verbalized understanding and agreement with the plan of care.

## 2024-04-29 NOTE — PATIENT INSTRUCTIONS
Increase clear fluid intake.    Take Keflex as ordered.  Take each dose with food decrease GI upset.   May take pyridium for bladder spasms/pain  May take tylenol or ibuprofen over the counter as needed for pain or fever  Follow up with PCP    Return to clinic for new symptoms

## 2024-10-17 ENCOUNTER — OFFICE VISIT (OUTPATIENT)
Dept: URGENT CARE | Facility: CLINIC | Age: 44
End: 2024-10-17
Payer: MEDICAID

## 2024-10-17 VITALS
SYSTOLIC BLOOD PRESSURE: 117 MMHG | WEIGHT: 139 LBS | TEMPERATURE: 99 F | RESPIRATION RATE: 16 BRPM | DIASTOLIC BLOOD PRESSURE: 82 MMHG | HEART RATE: 80 BPM | OXYGEN SATURATION: 100 % | HEIGHT: 61 IN | BODY MASS INDEX: 26.24 KG/M2

## 2024-10-17 DIAGNOSIS — R10.84 GENERALIZED ABDOMINAL PAIN: ICD-10-CM

## 2024-10-17 DIAGNOSIS — K59.00 CONSTIPATION, UNSPECIFIED CONSTIPATION TYPE: Primary | ICD-10-CM

## 2024-10-17 PROCEDURE — 74018 RADEX ABDOMEN 1 VIEW: CPT | Mod: S$GLB,,, | Performed by: RADIOLOGY

## 2024-10-17 PROCEDURE — 99214 OFFICE O/P EST MOD 30 MIN: CPT | Mod: S$GLB,,,

## 2024-10-17 RX ORDER — POLYETHYLENE GLYCOL 3350 17 G/17G
17 POWDER, FOR SOLUTION ORAL DAILY
Qty: 510 G | Refills: 1 | Status: SHIPPED | OUTPATIENT
Start: 2024-10-17

## 2024-10-17 RX ORDER — BISACODYL 10 MG/1
10 SUPPOSITORY RECTAL DAILY PRN
Qty: 4 SUPPOSITORY | Refills: 0 | Status: SHIPPED | OUTPATIENT
Start: 2024-10-17 | End: 2024-10-21

## 2024-10-17 NOTE — PATIENT INSTRUCTIONS
Dulcolax suppository daily as needed for constipation.  Do this for the next 2 days while beginning MiraLax.    For the next 2 days take a triple dose of MiraLax    Increase fiber in your diet for natural constipation relief.    For severe or worsening abdominal pain please go to the ER for further evaluation.  Also if you notice blood in your stool please go to the ER immediately for further evaluation

## 2024-10-17 NOTE — PROGRESS NOTES
"Subjective:      Patient ID: Teagan Escudero is a 44 y.o. female.    Vitals:  height is 5' 1" (1.549 m) and weight is 63 kg (139 lb). Her oral temperature is 98.8 °F (37.1 °C). Her blood pressure is 117/82 and her pulse is 80. Her respiration is 16 and oxygen saturation is 100%.     Chief Complaint: Constipation    44-year-old female presents for evaluation of abdominal pain and constipation for approximately 1 month.  She reports over the last month she has only been able to go to the bathroom approximately once a week and has to take laxatives in order to achieve this.  She has tried a saline enema as well with minimal results.  She was on Suboxone but reports she has been on this for approximately 2 years and has not had an issue prior to this.     Constipation  This is a new problem. The current episode started more than 1 month ago. The problem has been gradually worsening since onset. Her stool frequency is 1 time per week or less. The stool is described as pellet like and loose. The patient is not on a high fiber diet. She Exercises regularly. There has Been adequate water intake. Associated symptoms include abdominal pain. Pertinent negatives include no fever. She has tried laxatives for the symptoms. The treatment provided no relief.       Constitution: Negative for chills, fatigue and fever.   HENT: Negative.     Cardiovascular: Negative.    Respiratory: Negative.     Gastrointestinal:  Positive for abdominal pain, abdominal bloating and constipation.   Musculoskeletal: Negative.    Neurological: Negative.    Psychiatric/Behavioral: Negative.        Objective:     Physical Exam   Constitutional: She is oriented to person, place, and time. She appears well-developed.   HENT:   Head: Normocephalic and atraumatic.   Ears:   Right Ear: External ear normal.   Left Ear: External ear normal.   Nose: Nose normal.   Mouth/Throat: Mucous membranes are normal.   Eyes: Conjunctivae and lids are normal.   Neck: Trachea " normal. Neck supple.   Cardiovascular: Normal rate.   Pulmonary/Chest: Effort normal. No respiratory distress.   Abdominal: Normal appearance. She exhibits no distension and no mass. Soft. There is abdominal tenderness (Generalized). There is no rebound and no guarding.   Musculoskeletal: Normal range of motion.         General: Normal range of motion.   Neurological: She is alert and oriented to person, place, and time. She has normal strength. She displays no weakness.   Skin: Skin is warm, dry, intact, not diaphoretic and not pale.   Psychiatric: Her speech is normal and behavior is normal. Mood, judgment and thought content normal.   Nursing note and vitals reviewed.      Assessment:     1. Constipation, unspecified constipation type    2. Generalized abdominal pain      EXAMINATION:  XR KUB     CLINICAL HISTORY:  Constipation, unspecified     TECHNIQUE:  Single AP supine view of the abdomen (KUB) was performed     COMPARISON:  None     FINDINGS:  Stool is seen throughout the colon.  No abnormal calcifications are seen overlying the renal shadows.  There is no evidence bowel obstruction.  The osseous structures are unremarkable.     Impression:     Stool seen throughout the colon which may be seen with constipation.        Electronically signed by:Jovanna Casey MD  Date:                                            10/17/2024  Time:                                           09:46  Plan:       Constipation, unspecified constipation type  -     XR KUB; Future; Expected date: 10/17/2024  -     polyethylene glycol (GLYCOLAX) 17 gram/dose powder; Take 17 g by mouth once daily.  Dispense: 510 g; Refill: 1  -     bisacodyL (DULCOLAX, BISACODYL,) 10 mg Supp; Place 1 suppository (10 mg total) rectally daily as needed (constipation).  Dispense: 4 suppository; Refill: 0    Generalized abdominal pain      Reviewed x-ray results with patient.  Reviewed treatment plan to include prescribed MiraLax as well as over-the-counter  Dulcolax suppository as directed.    Discussed medication with patient who acknowledges understanding and is agreeable to POC. Follow up with primary care. Increase fluid intake. Red flags for ER discussed.

## 2024-11-25 ENCOUNTER — OFFICE VISIT (OUTPATIENT)
Dept: URGENT CARE | Facility: CLINIC | Age: 44
End: 2024-11-25
Payer: MEDICAID

## 2024-11-25 VITALS
HEIGHT: 61 IN | RESPIRATION RATE: 18 BRPM | DIASTOLIC BLOOD PRESSURE: 98 MMHG | WEIGHT: 142 LBS | SYSTOLIC BLOOD PRESSURE: 146 MMHG | TEMPERATURE: 99 F | OXYGEN SATURATION: 100 % | HEART RATE: 97 BPM | BODY MASS INDEX: 26.81 KG/M2

## 2024-11-25 DIAGNOSIS — K59.00 CONSTIPATION, UNSPECIFIED CONSTIPATION TYPE: Primary | ICD-10-CM

## 2024-11-25 PROCEDURE — 99214 OFFICE O/P EST MOD 30 MIN: CPT | Mod: S$GLB,,,

## 2024-11-25 RX ORDER — LACTULOSE 10 G/15ML
10 SOLUTION ORAL 3 TIMES DAILY
Qty: 225 ML | Refills: 0 | Status: SHIPPED | OUTPATIENT
Start: 2024-11-25 | End: 2024-11-30

## 2024-11-25 RX ORDER — METHYLNALTREXONE BROMIDE 8 MG/.4ML
8 INJECTION, SOLUTION SUBCUTANEOUS DAILY
Qty: 0.4 ML | Refills: 0 | Status: SHIPPED | OUTPATIENT
Start: 2024-11-25 | End: 2024-11-26

## 2024-11-25 NOTE — LETTER
November 25, 2024      [facility]  [address]  [facility] [address]  Phone: [phone]       Patient: Teagan Escudero   YOB: 1980  Date of Visit: 11/25/2024    To Whom It May Concern:    Lennox Escudero  was at [facility] on 11/25/2024. The patient may return to work/school on 11/26/2024 with no restrictions. If you have any questions or concerns, or if I can be of further assistance, please do not hesitate to contact me.    Sincerely,    Nimisha Dugan MA

## 2024-11-25 NOTE — PROGRESS NOTES
"Subjective:      Patient ID: Teagan Escudero is a 44 y.o. female.    Vitals:  height is 5' 1" (1.549 m) and weight is 64.4 kg (142 lb). Her temperature is 98.9 °F (37.2 °C). Her blood pressure is 146/98 (abnormal) and her pulse is 97. Her respiration is 18 and oxygen saturation is 100%.     Chief Complaint: No chief complaint on file.    In clinic with a complaint of constipation.  Constipation chronic problem.  Has tried over-the-counter medications as well as prescription medications.  He was on Suboxone.  Last bowel movement approximately 5 days ago.      Gastrointestinal:  Positive for abdominal pain and constipation.      Objective:     Physical Exam   Constitutional: She is oriented to person, place, and time. She is cooperative.  Non-toxic appearance. She does not appear ill. No distress.   HENT:   Head: Normocephalic and atraumatic.   Ears:   Right Ear: External ear normal.   Left Ear: External ear normal.   Nose: Nose normal.   Mouth/Throat: Uvula is midline, oropharynx is clear and moist and mucous membranes are normal. Mucous membranes are moist. Oropharynx is clear.   Eyes: Conjunctivae and lids are normal. Pupils are equal, round, and reactive to light. Extraocular movement intact   Neck: Trachea normal and phonation normal. Neck supple.   Cardiovascular: Normal rate, regular rhythm, normal heart sounds and normal pulses.   Pulmonary/Chest: Effort normal and breath sounds normal.   Abdominal: Normal appearance. Soft. flat abdomen There is abdominal tenderness (Generalized). There is no left CVA tenderness and no right CVA tenderness.   Musculoskeletal: Normal range of motion.         General: Normal range of motion.   Lymphadenopathy:     She has no cervical adenopathy.   Neurological: no focal deficit. She is alert, oriented to person, place, and time and at baseline. She has normal motor skills, normal sensation and intact cranial nerves (2-12). Gait and coordination normal. GCS eye subscore is 4. GCS " verbal subscore is 5. GCS motor subscore is 6.   Skin: Skin is warm, dry and not diaphoretic. Capillary refill takes 2 to 3 seconds.   Psychiatric: She experiences Normal attention and Normal perception. Her speech is normal and behavior is normal. Mood, judgment and thought content normal.   Nursing note and vitals reviewed.      Assessment:     1. Constipation, unspecified constipation type        Plan:       Constipation, unspecified constipation type  -     lactulose (CHRONULAC) 20 gram/30 mL Soln; Take 15 mLs (10 g total) by mouth 3 (three) times daily. for 5 days  Dispense: 225 mL; Refill: 0  -     methylnaltrexone (RELISTOR) 8 mg/0.4 mL subcutaneous injection; Inject 8 mg into the skin once daily. for 1 day  Dispense: 0.4 mL; Refill: 0        Generalized abdominal pain, and constipation.  Constipation chronic problem.  Has been seen in clinic previously for similar complaint.  Patient was also on Suboxone, will trial Relistor.  Specific return, follow up instructions discussed.  Patient was also given instructions to follow [facility] 3 day bowel cleanout regimen.

## 2024-11-26 ENCOUNTER — OFFICE VISIT (OUTPATIENT)
Dept: URGENT CARE | Facility: CLINIC | Age: 44
End: 2024-11-26
Payer: MEDICAID

## 2024-11-26 VITALS
WEIGHT: 142 LBS | HEART RATE: 87 BPM | SYSTOLIC BLOOD PRESSURE: 134 MMHG | TEMPERATURE: 99 F | RESPIRATION RATE: 18 BRPM | DIASTOLIC BLOOD PRESSURE: 86 MMHG | HEIGHT: 61 IN | BODY MASS INDEX: 26.81 KG/M2

## 2024-11-26 DIAGNOSIS — K59.09 CHRONIC CONSTIPATION: Primary | ICD-10-CM

## 2024-11-26 PROCEDURE — 99213 OFFICE O/P EST LOW 20 MIN: CPT | Mod: S$GLB,,, | Performed by: NURSE PRACTITIONER

## 2024-11-26 RX ORDER — METHYLNALTREXONE BROMIDE 8 MG/.4ML
8 INJECTION, SOLUTION SUBCUTANEOUS ONCE
Qty: 0.4 ML | Refills: 0 | Status: SHIPPED | OUTPATIENT
Start: 2024-11-26 | End: 2024-11-26

## 2024-11-26 NOTE — PATIENT INSTRUCTIONS
Take Relistor once as prescribed    Recommend daily exercise as tolerated, adequate water intake (six 8-oz glasses of water daily), and high fiber diet. OTC fiber supplements are recommended if diet does not reach daily fiber goal (20-30 grams daily), such as Benefiber (take as directed, separate from other oral medications by >2 hours).  -Use OTC stool softener such as Colace as directed to avoid hard stools and straining with bowel movements PRN  -Use OTC MiraLax once daily (8.5 to 17g PO) as directed PRN CONSTIPATION; discussed about trying maybe once every other day  - If no improvement with above recommendations, try intermittently dosed Dulcolax OTC as directed (every 3-4  days) PRN to facilitate bowel movements-If still no improvement with these measures, call/follow-upPatient

## 2024-11-26 NOTE — PROGRESS NOTES
"Subjective:      Patient ID: Teagan Escudero is a 44 y.o. female.    Vitals:  height is 5' 1" (1.549 m) and weight is 64.4 kg (142 lb). Her temperature is 98.7 °F (37.1 °C). Her blood pressure is 134/86 and her pulse is 87. Her respiration is 18.     Chief Complaint: Abdominal Pain    44-year-old female seen today for abdominal pain and chronic constipation.  Patient was seen in this clinic yesterday and given a prescription for Relistor.  She states the instructions for the syringe were unclear, causing her to lose all of the medication before it was injected.  She was here requesting a refill on the relistor injection.    Abdominal Pain  This is a new problem. Associated symptoms include constipation. Pertinent negatives include no fever, nausea or vomiting.       Constitution: Negative for chills and fever.   Cardiovascular:  Negative for chest pain, palpitations and sob on exertion.   Respiratory:  Negative for shortness of breath.    Gastrointestinal:  Positive for abdominal pain and constipation. Negative for nausea and vomiting.   Neurological:  Negative for dizziness, light-headedness, passing out, disorientation and altered mental status.   Psychiatric/Behavioral:  Negative for altered mental status, disorientation and confusion.       Objective:     Physical Exam   Constitutional: She is oriented to person, place, and time. She appears well-developed. She is cooperative.  Non-toxic appearance. She does not appear ill. No distress.   HENT:   Head: Normocephalic and atraumatic.   Nose: Nose normal.   Mouth/Throat: Oropharynx is clear and moist and mucous membranes are normal. Mucous membranes are moist.   Eyes: Conjunctivae and lids are normal. No scleral icterus.   Neck: Trachea normal and phonation normal. Neck supple.   Cardiovascular: Normal rate, regular rhythm, normal heart sounds and normal pulses.   Pulmonary/Chest: Effort normal and breath sounds normal. No stridor. No respiratory distress. " Anesthesia Pre Eval Note    Anesthesia ROS/Med Hx    Overall Review:  EKG was reviewed and Echo was reviewed     Anesthetic Complication History:  Patient does not have a history of anesthetic complications      Pulmonary Review:  Patient does not have a pulmonary history      Neuro/Psych Review:  Patient does not have a neuro/psych history       Cardiovascular Review:    Positive for CHF  Positive for valvular problems/murmurs  Positive for hypertension  Positive for hyperlipidemia    GI/HEPATIC/RENAL Review:  Patient does not have a GI/hepatic/renalhistory       End/Other Review:  Positive for diabetes  Positive for obesity class II - 35.00 - 39.99  Additional Results:  EKG:  No results found for this or any previous visit (from the past 4464 hour(s)). Echo:  No results found for: \"EF\"      Relevant Problems   No relevant active problems       Physical Exam     Airway   Mallampati: II  TM Distance: >3 FB  Neck ROM: Full    Cardiovascular  Cardiovascular exam normal  Cardio Rhythm: Regular  Cardio Rate: Normal    Head Assessment  Head assessment: Atraumatic and Normocephalic    General Assessment  General Assessment: Alert and oriented and No acute distress    Pulmonary Exam  Pulmonary exam normal  Patient Demonstrates:  Non-labored Breathing    Abdominal Exam    Patient Demonstrates:  Obese      Anesthesia Plan:    ASA Status: 3  Anesthesia Type: MAC    Induction: Intravenous  Preferred Airway Type: Mask  Maintenance: TIVA  Premedication: None      Post-op Pain Management: Per Surgeon  Postoperative analgesia plan does NOT include opiods    Checklist  Reviewed: Lab Results, NPO Status, Problem list, Medications, Allergies, Past Med History, EKG, Nursing Notes, Consultations, Patient Summary, Beta Blocker Status and Care Everywhere  Consent/Risks Discussed Statement:  The proposed anesthetic plan, including its risks and benefits, have been discussed with the Patient along with the risks and benefits of    Abdominal: Normal appearance and bowel sounds are normal. She exhibits distension. She exhibits no mass. Soft. flat abdomen There is no splenomegaly or hepatomegaly. There is generalized abdominal tenderness. There is no rebound, no guarding, no tenderness at McBurney's point, negative Hill's sign, no left CVA tenderness, negative Rovsing's sign, negative psoas sign, no right CVA tenderness and negative obturator sign. No hernia.   Musculoskeletal:         General: No deformity.   Neurological: no focal deficit. She is alert and oriented to person, place, and time. She has normal strength and normal reflexes. No sensory deficit.   Skin: Skin is warm, dry, intact and not diaphoretic. Capillary refill takes 2 to 3 seconds.   Psychiatric: Her speech is normal and behavior is normal. Judgment and thought content normal.   Nursing note and vitals reviewed.      Assessment:     1. Chronic constipation        Plan:       Chronic constipation  -     methylnaltrexone (RELISTOR) 8 mg/0.4 mL subcutaneous injection; Inject 8 mg into the skin once. for 1 dose  Dispense: 0.4 mL; Refill: 0        The physical exam findings were discussed with the patient and all questions answered. We discussed symptom monitoring, conservative care methods, medication use, and follow up orders. she verbalized understanding and agreement with the plan of care.               alternatives. Questions were encouraged and answered and the patient and/or representative understands and agrees to proceed.    I have discussed elements of the patient's history or examination, as noted above and/or as follows, that place the patient at higher risk of complications; BMI> 30 (obesity) and heart disease.    I discussed with the patient (and/or patient's legal representative) the risks and benefits of the proposed anesthesia plan, MAC, which may include services performed by other anesthesia providers.    Alternative anesthesia plans, if available, were reviewed with the patient (and/or patient's legal representative). Discussion has been held with the patient (and/or patient's legal representative) regarding risks of anesthesia, which include allergic reaction, anxiety, aspiration, back pain, conversion to general anesthesia, depressed breathing, dental injury, oral injury, emergence delirium, hypotension, intra-operative awareness, nausea, vomiting, sore throat, organ damage and post-op intubation and emergent situations that may require change in anesthesia plan.    The patient (and/or patient's legal representative) has indicated understanding, his/her questions have been answered, and he/she wishes to proceed with the planned anesthetic.    Blood Products: Not Anticipated

## 2024-11-27 ENCOUNTER — HOSPITAL ENCOUNTER (EMERGENCY)
Facility: HOSPITAL | Age: 44
Discharge: HOME | End: 2024-11-27
Attending: EMERGENCY MEDICINE
Payer: MEDICAID

## 2024-11-27 VITALS
HEIGHT: 61 IN | DIASTOLIC BLOOD PRESSURE: 70 MMHG | WEIGHT: 142 LBS | OXYGEN SATURATION: 99 % | SYSTOLIC BLOOD PRESSURE: 140 MMHG | RESPIRATION RATE: 16 BRPM | HEART RATE: 80 BPM | BODY MASS INDEX: 26.81 KG/M2 | TEMPERATURE: 99 F

## 2024-11-27 DIAGNOSIS — K59.00 CONSTIPATION: ICD-10-CM

## 2024-11-27 LAB
B-HCG UR QL: NEGATIVE
BILIRUB UR QL STRIP: NEGATIVE
CLARITY UR: CLEAR
COLOR UR: YELLOW
CTP QC/QA: YES
GLUCOSE UR QL STRIP: NEGATIVE
HGB UR QL STRIP: NEGATIVE
KETONES UR QL STRIP: NEGATIVE
LEUKOCYTE ESTERASE UR QL STRIP: NEGATIVE
NITRITE UR QL STRIP: NEGATIVE
PH UR STRIP: 7 [PH] (ref 5–8)
PROT UR QL STRIP: ABNORMAL
SP GR UR STRIP: 1.03 (ref 1–1.03)
URN SPEC COLLECT METH UR: ABNORMAL
UROBILINOGEN UR STRIP-ACNC: NEGATIVE EU/DL

## 2024-11-27 PROCEDURE — 99284 EMERGENCY DEPT VISIT MOD MDM: CPT | Mod: 25

## 2024-11-27 PROCEDURE — 81003 URINALYSIS AUTO W/O SCOPE: CPT | Performed by: EMERGENCY MEDICINE

## 2024-11-27 PROCEDURE — 63600175 PHARM REV CODE 636 W HCPCS: Mod: JZ,JG | Performed by: EMERGENCY MEDICINE

## 2024-11-27 PROCEDURE — 96372 THER/PROPH/DIAG INJ SC/IM: CPT | Performed by: EMERGENCY MEDICINE

## 2024-11-27 PROCEDURE — 81025 URINE PREGNANCY TEST: CPT | Performed by: EMERGENCY MEDICINE

## 2024-11-27 RX ADMIN — METHYLNALTREXONE BROMIDE 12 MG: 12 INJECTION, SOLUTION SUBCUTANEOUS at 05:11

## 2024-11-27 NOTE — ED PROVIDER NOTES
Encounter Date: 2024       History     Chief Complaint   Patient presents with    Constipation     Constipation issues for the last month. LBM was 8 days ago.     Patient presents emergency department with reported constipation states has not had a bowel movement in 8 days she has a history of Suboxone use was given a prescription for Relistor but when she received the injection it malfunction she was unable to take it they have reordered the medication but will not be available till Monday she presents the ER concerned she will not be able to wait till Monday for relief from the constipation she has tried numerous over-the-counter medications without improvement patient reports crampy abdominal pain at times nausea but no vomiting no diarrhea no fever chills no blood in her stool        Review of patient's allergies indicates:  No Known Allergies  Past Medical History:   Diagnosis Date    Anxiety      Past Surgical History:   Procedure Laterality Date    BLADDER SURGERY      Bladder had ruptured.      SECTION      x3    OOPHORECTOMY      SALPINGOOPHORECTOMY  2012    TUBAL LIGATION  2009     Family History   Problem Relation Name Age of Onset    Lung cancer Paternal Grandfather      Cancer Maternal Grandfather       Social History     Tobacco Use    Smoking status: Every Day     Current packs/day: 0.50     Average packs/day: 0.5 packs/day for 1 year (0.5 ttl pk-yrs)     Types: Cigarettes    Smokeless tobacco: Never   Substance Use Topics    Alcohol use: Yes     Comment: socially    Drug use: No     Review of Systems   Constitutional:  Negative for chills, fatigue and fever.   HENT:  Negative for congestion.    Respiratory:  Negative for cough and wheezing.    Cardiovascular:  Negative for chest pain.   Gastrointestinal:  Positive for abdominal pain and constipation. Negative for anal bleeding, diarrhea, nausea and vomiting.   Genitourinary:  Negative for dysuria.   All other systems reviewed and  are negative.      Physical Exam     Initial Vitals   BP Pulse Resp Temp SpO2   11/27/24 1333 11/27/24 1333 11/27/24 1333 11/27/24 1332 11/27/24 1333   138/79 101 18 99.1 °F (37.3 °C) 99 %      MAP       --                Physical Exam    Constitutional: She appears well-developed and well-nourished. No distress.   HENT:   Head: Normocephalic and atraumatic.   Right Ear: External ear normal.   Left Ear: External ear normal. Mouth/Throat: Oropharynx is clear and moist.   Eyes: Conjunctivae and EOM are normal. Pupils are equal, round, and reactive to light.   Neck: Neck supple.   Normal range of motion.  Cardiovascular:  Normal rate, regular rhythm, normal heart sounds and intact distal pulses.           Pulmonary/Chest: Breath sounds normal. No respiratory distress.   Abdominal: Abdomen is soft. Bowel sounds are normal. There is abdominal tenderness in the left lower quadrant.   Musculoskeletal:         General: No edema. Normal range of motion.      Cervical back: Normal range of motion and neck supple.     Neurological: She is alert and oriented to person, place, and time. GCS score is 15. GCS eye subscore is 4. GCS verbal subscore is 5. GCS motor subscore is 6.   Skin: Skin is warm and dry. Capillary refill takes less than 2 seconds. No rash noted.   Psychiatric: She has a normal mood and affect. Her behavior is normal.         ED Course   Procedures  Labs Reviewed   URINALYSIS, REFLEX TO URINE CULTURE - Abnormal       Result Value    Specimen UA Urine, Clean Catch      Color, UA Yellow      Appearance, UA Clear      pH, UA 7.0      Specific Gravity, UA 1.030      Protein, UA Trace (*)     Glucose, UA Negative      Ketones, UA Negative      Bilirubin (UA) Negative      Occult Blood UA Negative      Nitrite, UA Negative      Urobilinogen, UA Negative      Leukocytes, UA Negative      Narrative:     Specimen Source->Urine   HEPATITIS C ANTIBODY   HIV 1 / 2 ANTIBODY   POCT URINE PREGNANCY    POC Preg Test, Ur  Negative       Acceptable Yes            Imaging Results              X-Ray Abdomen Flat And Erect (Final result)  Result time 11/27/24 16:09:52      Final result by Santi Bravo MD (11/27/24 16:09:52)                   Impression:      As above.      Electronically signed by: Santi Bravo  Date:    11/27/2024  Time:    16:09               Narrative:    EXAMINATION:  XR ABDOMEN FLAT AND ERECT    CLINICAL HISTORY:  Constipation, unspecified    TECHNIQUE:  Flat and erect AP views of the abdomen were performed.    COMPARISON:  10/17/2024    FINDINGS:  Nonspecific bowel gas pattern.  Moderate air scattered throughout the colon.  Possible mild gaseous distension of several small bowel loops.  No free intraperitoneal air.  No renal calcification.  Osseous structures appear intact.                                       Medications   methylnaltrexone 12 mg/0.6 mL subcutaneous injection 12 mg (has no administration in time range)     Medical Decision Making  Radiographs show no evidence of obstruction will administer a dose of Relistor in the emergency department release patient with outpatient follow-up primary care provider continue laxative therapy at home the event further problems    Amount and/or Complexity of Data Reviewed  Labs: ordered.  Radiology: ordered.    Risk  Prescription drug management.                                      Clinical Impression:  Final diagnoses:  [K59.00] Constipation          ED Disposition Condition    Discharge Stable          ED Prescriptions    None       Follow-up Information       Follow up With Specialties Details Why Contact [facility]  Call in 1 day  [address] [place] [address]  [phone]               Raul Villanueva MD  11/27/24 0612

## 2025-02-19 ENCOUNTER — OFFICE VISIT (OUTPATIENT)
Dept: URGENT CARE | Facility: CLINIC | Age: 45
End: 2025-02-19
Payer: MEDICAID

## 2025-02-19 VITALS
TEMPERATURE: 98 F | WEIGHT: 142 LBS | HEIGHT: 61 IN | RESPIRATION RATE: 18 BRPM | HEART RATE: 74 BPM | SYSTOLIC BLOOD PRESSURE: 122 MMHG | BODY MASS INDEX: 26.81 KG/M2 | DIASTOLIC BLOOD PRESSURE: 75 MMHG | OXYGEN SATURATION: 99 %

## 2025-02-19 DIAGNOSIS — R30.0 DYSURIA: ICD-10-CM

## 2025-02-19 DIAGNOSIS — N30.00 ACUTE CYSTITIS WITHOUT HEMATURIA: Primary | ICD-10-CM

## 2025-02-19 LAB
BILIRUB UR QL STRIP: NEGATIVE
GLUCOSE UR QL STRIP: NEGATIVE
KETONES UR QL STRIP: NEGATIVE
LEUKOCYTE ESTERASE UR QL STRIP: POSITIVE
PH, POC UA: 6
POC BLOOD, URINE: POSITIVE
POC NITRATES, URINE: NEGATIVE
PROT UR QL STRIP: NEGATIVE
SP GR UR STRIP: 1.02 (ref 1–1.03)
UROBILINOGEN UR STRIP-ACNC: 0.2 (ref 0.1–1.1)

## 2025-02-19 RX ORDER — CEFUROXIME AXETIL 500 MG/1
500 TABLET ORAL EVERY 12 HOURS
Qty: 14 TABLET | Refills: 0 | Status: SHIPPED | OUTPATIENT
Start: 2025-02-19 | End: 2025-02-26

## 2025-02-19 NOTE — PROGRESS NOTES
"Subjective:      Patient ID: Teagan Escudero is a 44 y.o. female.    Vitals:  height is 5' 1" (1.549 m) and weight is 64.4 kg (142 lb). Her temperature is 98.1 °F (36.7 °C). Her blood pressure is 122/75 and her pulse is 74. Her respiration is 18 and oxygen saturation is 99%.     Chief Complaint: Urinary Tract Infection    Burning and painful urination x4days     Urinary Tract Infection   This is a new problem. The current episode started in the past 7 days. Associated symptoms include frequency. Pertinent negatives include no chills or hematuria. Her past medical history is significant for recurrent UTIs.       Constitution: Negative for chills, fatigue and fever.   Genitourinary:  Positive for dysuria and frequency. Negative for hematuria.   Musculoskeletal:  Positive for back pain (low back).      Objective:     Physical Exam   Constitutional: She is oriented to person, place, and time. She appears well-developed.   HENT:   Head: Normocephalic and atraumatic.   Ears:   Right Ear: External ear normal.   Left Ear: External ear normal.   Nose: Nose normal. No nasal deformity. No epistaxis.   Mouth/Throat: Oropharynx is clear and moist and mucous membranes are normal.   Eyes: Lids are normal.   Neck: Trachea normal and phonation normal. Neck supple.   Cardiovascular: Normal rate.   Pulmonary/Chest: Effort normal. No respiratory distress.   Abdominal: Normal appearance. She exhibits no distension. Soft. There is no abdominal tenderness.   Neurological: She is alert and oriented to person, place, and time. She displays no weakness.   Skin: Skin is warm, dry and intact.   Psychiatric: Her speech is normal and behavior is normal. Mood, judgment and thought content normal.   Nursing note and vitals reviewed.      Assessment:     1. Acute cystitis without hematuria    2. Dysuria        Plan:       Acute cystitis without hematuria  -     cefUROXime (CEFTIN) 500 MG tablet; Take 1 tablet (500 mg total) by mouth every 12 (twelve) " hours. for 7 days  Dispense: 14 tablet; Refill: 0    Dysuria  -     POCT Urinalysis, Dipstick, Manual, W/O Scope      UA: abnormal    Discussed medication with patient who acknowledges understanding and is agreeable to POC. Follow up with primary care. Increase fluid intake. Red flags for ER discussed.

## 2025-03-09 ENCOUNTER — OFFICE VISIT (OUTPATIENT)
Dept: URGENT CARE | Facility: CLINIC | Age: 45
End: 2025-03-09
Payer: MEDICAID

## 2025-03-09 VITALS
DIASTOLIC BLOOD PRESSURE: 84 MMHG | OXYGEN SATURATION: 98 % | WEIGHT: 132 LBS | SYSTOLIC BLOOD PRESSURE: 124 MMHG | RESPIRATION RATE: 20 BRPM | HEART RATE: 84 BPM | TEMPERATURE: 99 F | HEIGHT: 61 IN | BODY MASS INDEX: 24.92 KG/M2

## 2025-03-09 DIAGNOSIS — B37.31 VAGINAL YEAST INFECTION: Primary | ICD-10-CM

## 2025-03-09 DIAGNOSIS — N89.8 VAGINAL DISCHARGE: ICD-10-CM

## 2025-03-09 DIAGNOSIS — N89.8 VAGINAL IRRITATION: ICD-10-CM

## 2025-03-09 DIAGNOSIS — R35.0 FREQUENT URINATION: ICD-10-CM

## 2025-03-09 LAB
BILIRUB UR QL STRIP: NEGATIVE
GLUCOSE UR QL STRIP: NEGATIVE
KETONES UR QL STRIP: NEGATIVE
LEUKOCYTE ESTERASE UR QL STRIP: POSITIVE
PH, POC UA: 6
POC BLOOD, URINE: POSITIVE
POC NITRATES, URINE: NEGATIVE
PROT UR QL STRIP: NEGATIVE
SP GR UR STRIP: 1.01 (ref 1–1.03)
UROBILINOGEN UR STRIP-ACNC: NEGATIVE (ref 0.1–1.1)

## 2025-03-09 PROCEDURE — 99214 OFFICE O/P EST MOD 30 MIN: CPT | Mod: S$GLB,,,

## 2025-03-09 PROCEDURE — 81003 URINALYSIS AUTO W/O SCOPE: CPT | Mod: QW,S$GLB,,

## 2025-03-09 RX ORDER — FLUCONAZOLE 150 MG/1
150 TABLET ORAL DAILY
Qty: 2 TABLET | Refills: 0 | Status: SHIPPED | OUTPATIENT
Start: 2025-03-09 | End: 2025-03-11

## 2025-03-09 NOTE — PROGRESS NOTES
"Subjective:      Patient ID: Teagan Escudero is a 44 y.o. female.    Vitals:  height is 5' 1" (1.549 m) and weight is 59.9 kg (132 lb). Her oral temperature is 98.5 °F (36.9 °C). Her blood pressure is 124/84 and her pulse is 84. Her respiration is 20 and oxygen saturation is 98%.     Chief Complaint: Urinary Tract Infection    Urinary Tract Infection   This is a new problem. The current episode started yesterday. The problem has been gradually worsening. The quality of the pain is described as aching and burning. The pain is at a severity of 4/10. The pain is moderate. There has been no fever. She is Sexually active. There is No history of pyelonephritis. Associated symptoms include frequency. Pertinent negatives include no chills. Associated symptoms comments: Vaginal itching. Treatments tried: Vagisil cream, AZO. The treatment provided no relief. Her past medical history is significant for recurrent UTIs.       Constitution: Negative for chills, fatigue and fever.   Genitourinary:  Positive for dysuria, frequency, vaginal pain, vaginal discharge (white/thick) and vaginal odor. Negative for vaginal bleeding and genital sore.      Objective:     Physical Exam   Constitutional: She is oriented to person, place, and time. She appears well-developed.   HENT:   Head: Normocephalic and atraumatic.   Nose: No nasal deformity. No epistaxis.   Mouth/Throat: Oropharynx is clear and moist and mucous membranes are normal.   Eyes: Lids are normal.   Neck: Trachea normal and phonation normal. Neck supple.   Abdominal: Normal appearance. She exhibits no distension. Soft. There is no abdominal tenderness.   Genitourinary:         Comments: Declined       Neurological: She is alert and oriented to person, place, and time. She displays no weakness.   Skin: Skin is warm, dry and intact.   Psychiatric: Her speech is normal and behavior is normal. Mood, judgment and thought content normal.   Nursing note and vitals " reviewed.      Assessment:     1. Vaginal yeast infection    2. Frequent urination    3. Vaginal discharge    4. Vaginal irritation        Plan:       Vaginal yeast infection  -     fluconazole (DIFLUCAN) 150 MG Tab; Take 1 tablet (150 mg total) by mouth once daily. for 2 days  Dispense: 2 tablet; Refill: 0    Frequent urination  -     POCT Urinalysis, Dipstick, Manual, W/O Scope  -     Urine Culture High Risk    Vaginal discharge    Vaginal irritation      UA: normal  Urine culture sent  Declined vaginal swab. I have high suspicion of yeast d/t presentation    Dictation #1  MRN:[id number]  CSN:[id number]   Discussed medication with patient who acknowledges understanding and is agreeable to POC. Follow up with primary care. Increase fluid intake. Red flags for ER discussed.

## 2025-03-09 NOTE — PATIENT INSTRUCTIONS
Take 1 Diflucan as soon as you  the prescription.  If 3 days you are still having symptoms you can take the 2nd.    Please follow up with gyn or return to clinic with further concerns.    We will call you with urine culture results once they are available on approximately 5-10 days.

## 2025-03-12 LAB
BACTERIA UR CULT: NORMAL
BACTERIA UR CULT: NORMAL

## 2025-03-13 ENCOUNTER — RESULTS FOLLOW-UP (OUTPATIENT)
Dept: URGENT CARE | Facility: CLINIC | Age: 45
End: 2025-03-13
Payer: MEDICAID

## 2025-05-14 ENCOUNTER — OFFICE VISIT (OUTPATIENT)
Dept: URGENT CARE | Facility: CLINIC | Age: 45
End: 2025-05-14
Payer: MEDICAID

## 2025-05-14 VITALS
DIASTOLIC BLOOD PRESSURE: 75 MMHG | SYSTOLIC BLOOD PRESSURE: 112 MMHG | RESPIRATION RATE: 16 BRPM | HEART RATE: 98 BPM | HEIGHT: 61 IN | WEIGHT: 132 LBS | OXYGEN SATURATION: 97 % | TEMPERATURE: 99 F | BODY MASS INDEX: 24.92 KG/M2

## 2025-05-14 DIAGNOSIS — N89.8 VAGINAL IRRITATION: Primary | ICD-10-CM

## 2025-05-14 DIAGNOSIS — R30.9 PAIN WITH URINATION: ICD-10-CM

## 2025-05-14 LAB
B-HCG UR QL: NEGATIVE
BILIRUB UR QL STRIP: NEGATIVE
CTP QC/QA: YES
GLUCOSE UR QL STRIP: NEGATIVE
KETONES UR QL STRIP: NEGATIVE
LEUKOCYTE ESTERASE UR QL STRIP: NEGATIVE
PH, POC UA: 6
POC BLOOD, URINE: POSITIVE
POC NITRATES, URINE: NEGATIVE
PROT UR QL STRIP: NEGATIVE
SP GR UR STRIP: 1.01 (ref 1–1.03)
UROBILINOGEN UR STRIP-ACNC: NEGATIVE (ref 0.1–1.1)

## 2025-05-14 PROCEDURE — 81025 URINE PREGNANCY TEST: CPT | Mod: S$GLB,,,

## 2025-05-14 PROCEDURE — 99214 OFFICE O/P EST MOD 30 MIN: CPT | Mod: S$GLB,,,

## 2025-05-14 PROCEDURE — 81003 URINALYSIS AUTO W/O SCOPE: CPT | Mod: QW,S$GLB,,

## 2025-05-14 RX ORDER — METRONIDAZOLE 500 MG/1
500 TABLET ORAL EVERY 12 HOURS
Qty: 14 TABLET | Refills: 0 | Status: SHIPPED | OUTPATIENT
Start: 2025-05-14 | End: 2025-05-21

## 2025-05-14 NOTE — PATIENT INSTRUCTIONS
Pepcid AC 20 mg twice daily.  Take 45 minutes prior to meals.    Follow up with gyn    Do not drink alcohol while taking Flagyl.

## 2025-05-14 NOTE — PROGRESS NOTES
"Subjective:      Patient ID: Teagan Escudero is a 44 y.o. female.    Vitals:  height is 5' 1" (1.549 m) and weight is 59.9 kg (132 lb). Her temperature is 98.6 °F (37 °C). Her blood pressure is 112/75 and her pulse is 98. Her respiration is 16 and oxygen saturation is 97%.     Chief Complaint: Dysuria    Patient in clinic with a complaint of vaginal burning that started approximately 2 days ago.  She states last night she did not sleep because she had urinary urgency and frequency as well.  She denies dysuria, or hematuria.  She also denies vaginal discharge.  She is currently on her menstrual cycle.  She is also complaining of unrelated indigestion.  She has tried Rolaids, and heartburn medications with no relief.  She knows her specific triggers.  She does not have a women's health, or primary care doctor.    Dysuria   This is a new problem. The current episode started in the past 7 days. Associated symptoms include frequency and urgency. Pertinent negatives include no discharge, hematuria, hesitancy or nausea. There is no history of diabetes mellitus, kidney stones, recurrent UTIs or STD. Recurrent vaginosis       Constitution: Negative.   HENT: Negative.     Neck: neck negative.   Cardiovascular: Negative.    Respiratory: Negative.     Gastrointestinal:  Positive for heartburn. Negative for nausea.   Genitourinary:  Positive for frequency, urgency and vaginal pain. Negative for dysuria, hematuria and vaginal discharge.      Objective:     Physical Exam   Constitutional: She is oriented to person, place, and time. She is cooperative.  Non-toxic appearance. She does not appear ill. No distress.   HENT:   Head: Normocephalic and atraumatic.   Ears:   Right Ear: External ear normal.   Left Ear: External ear normal.   Nose: Nose normal.   Mouth/Throat: Uvula is midline, oropharynx is clear and moist and mucous membranes are normal. Mucous membranes are moist. Oropharynx is clear.   Eyes: Conjunctivae and lids are " normal. Pupils are equal, round, and reactive to light. Extraocular movement intact   Neck: Trachea normal and phonation normal. Neck supple.   Cardiovascular: Normal rate, regular rhythm, normal heart sounds and normal pulses.   Pulmonary/Chest: Effort normal and breath sounds normal.   Abdominal: Normal appearance. Soft. flat abdomen There is no abdominal tenderness. There is no left CVA tenderness and no right CVA tenderness.   Lymphadenopathy:     She has no cervical adenopathy.   Neurological: no focal deficit. She is alert, oriented to person, place, and time and at baseline. She has normal motor skills, normal sensation and intact cranial nerves (2-12). Gait and coordination normal. GCS eye subscore is 4. GCS verbal subscore is 5. GCS motor subscore is 6.   Skin: Skin is warm, dry and not diaphoretic. Capillary refill takes 2 to 3 seconds.   Psychiatric: She experiences Normal attention and Normal perception. Her speech is normal and behavior is normal. Mood, judgment and thought content normal.   Nursing note and vitals reviewed.      Assessment:     1. Vaginal irritation    2. Pain with urination        Plan:       Vaginal irritation  -     Urine culture  -     metroNIDAZOLE (FLAGYL) 500 MG tablet; Take 1 tablet (500 mg total) by mouth every 12 (twelve) hours. for 7 days  Dispense: 14 tablet; Refill: 0  -     Ambulatory referral/consult to Obstetrics / Gynecology    Pain with urination  -     POCT Urinalysis, Dipstick, Manual, W/O Scope  -     POCT urine pregnancy          Medical Decision Making:   History:   Old Medical Records: I decided to obtain old medical records.  Old Records Summarized: records from clinic visits.  Initial Assessment:   Previous cultures reviewed.  Patient has more recurrent BV.  Her symptoms are not consistent with UTI.  She is reporting more vaginal irritation and burning then dysuria.  I suspect that she has BV, and will treat has such.  I will also send for urine culture.  She  is also reporting indigestion refractory to OTC meds.  She has not tried Pepcid.  She is also taking these medications after symptom onset.  I have given her education on avoiding food triggers, avoiding late night eating, avoid lying flat after eating, and use of Pepcid AC 20 mg b.i.d. 45 minutes prior to meals.  She is well-appearing and nontoxic.  I have referred her to gyn, she has not had a women's health exam in several years.  Differential Diagnosis:   Nephrolithiasis, pyelonephritis, vulvitis, STI, PID  Clinical Tests:   Lab Tests: Ordered and Reviewed  Urgent Care Management:  Deferred pelvic exam

## 2025-05-20 ENCOUNTER — RESULTS FOLLOW-UP (OUTPATIENT)
Dept: URGENT CARE | Facility: CLINIC | Age: 45
End: 2025-05-20
Payer: MEDICAID

## 2025-05-20 DIAGNOSIS — R82.79 POSITIVE URINE CULTURE: Primary | ICD-10-CM

## 2025-05-20 DIAGNOSIS — N30.00 ACUTE CYSTITIS WITHOUT HEMATURIA: ICD-10-CM

## 2025-05-20 RX ORDER — CEPHALEXIN 500 MG/1
500 CAPSULE ORAL EVERY 12 HOURS
Qty: 14 CAPSULE | Refills: 0 | Status: SHIPPED | OUTPATIENT
Start: 2025-05-20 | End: 2025-05-27

## 2025-06-10 ENCOUNTER — HOSPITAL ENCOUNTER (EMERGENCY)
Facility: HOSPITAL | Age: 45
Discharge: HOME | End: 2025-06-10
Attending: EMERGENCY MEDICINE
Payer: MEDICAID

## 2025-06-10 VITALS
HEART RATE: 77 BPM | RESPIRATION RATE: 16 BRPM | BODY MASS INDEX: 24.92 KG/M2 | TEMPERATURE: 98 F | SYSTOLIC BLOOD PRESSURE: 97 MMHG | DIASTOLIC BLOOD PRESSURE: 65 MMHG | OXYGEN SATURATION: 97 % | WEIGHT: 132 LBS | HEIGHT: 61 IN

## 2025-06-10 DIAGNOSIS — K29.70 GASTRITIS: ICD-10-CM

## 2025-06-10 DIAGNOSIS — N39.0 URINARY TRACT INFECTION WITH HEMATURIA, SITE UNSPECIFIED: ICD-10-CM

## 2025-06-10 DIAGNOSIS — N83.202 CYST OF LEFT OVARY: ICD-10-CM

## 2025-06-10 DIAGNOSIS — R31.9 URINARY TRACT INFECTION WITH HEMATURIA, SITE UNSPECIFIED: ICD-10-CM

## 2025-06-10 DIAGNOSIS — K57.90 DIVERTICULOSIS: ICD-10-CM

## 2025-06-10 DIAGNOSIS — K52.9 GASTROENTERITIS: Primary | ICD-10-CM

## 2025-06-10 LAB
ABSOLUTE EOSINOPHIL (SMH): 0.02 K/UL
ABSOLUTE MONOCYTE (SMH): 0.46 K/UL (ref 0.3–1)
ABSOLUTE NEUTROPHIL COUNT (SMH): 4.3 K/UL (ref 1.8–7.7)
ALBUMIN SERPL-MCNC: 4.1 G/DL (ref 3.5–5.2)
ALP SERPL-CCNC: 104 UNIT/L (ref 40–150)
ALT SERPL-CCNC: 10 UNIT/L (ref 10–44)
ANION GAP (SMH): 12 MMOL/L (ref 8–16)
AST SERPL-CCNC: 24 UNIT/L (ref 11–45)
B-HCG UR QL: NEGATIVE
BACTERIA #/AREA URNS AUTO: ABNORMAL /HPF
BACTERIA GENITAL QL WET PREP: ABNORMAL
BASOPHILS # BLD AUTO: 0.01 K/UL
BASOPHILS NFR BLD AUTO: 0.2 %
BILIRUB SERPL-MCNC: 1.2 MG/DL (ref 0.1–1)
BILIRUB UR QL STRIP.AUTO: NEGATIVE
BUN SERPL-MCNC: 10 MG/DL (ref 6–20)
CALCIUM SERPL-MCNC: 8.7 MG/DL (ref 8.7–10.5)
CHLORIDE SERPL-SCNC: 103 MMOL/L (ref 95–110)
CLARITY UR: ABNORMAL
CLUE CELLS VAG QL WET PREP: ABNORMAL
CO2 SERPL-SCNC: 23 MMOL/L (ref 23–29)
COLOR UR AUTO: ABNORMAL
CREAT SERPL-MCNC: 0.7 MG/DL (ref 0.5–1.4)
CTP QC/QA: YES
ERYTHROCYTE [DISTWIDTH] IN BLOOD BY AUTOMATED COUNT: 12.8 % (ref 11.5–14.5)
FILAMENT FUNGI VAG WET PREP-#/AREA: ABNORMAL
GFR SERPLBLD CREATININE-BSD FMLA CKD-EPI: >60 ML/MIN/1.73/M2
GLUCOSE SERPL-MCNC: 98 MG/DL (ref 70–110)
GLUCOSE UR QL STRIP: NEGATIVE
HCT VFR BLD AUTO: 43.8 % (ref 37–48.5)
HCV AB SERPL QL IA: NORMAL
HGB BLD-MCNC: 14.9 GM/DL (ref 12–16)
HGB UR QL STRIP: ABNORMAL
HIV 1+2 AB+HIV1 P24 AG SERPL QL IA: NORMAL
HOLD SPECIMEN: NORMAL
HOLD SPECIMEN: NORMAL
IMM GRANULOCYTES # BLD AUTO: 0.01 K/UL (ref 0–0.04)
IMM GRANULOCYTES NFR BLD AUTO: 0.2 % (ref 0–0.5)
KETONES UR QL STRIP: ABNORMAL
LACTATE SERPL-SCNC: 1.6 MMOL/L (ref 0.5–2.2)
LEUKOCYTE ESTERASE UR QL STRIP: ABNORMAL
LIPASE SERPL-CCNC: 12 U/L (ref 4–60)
LYMPHOCYTES # BLD AUTO: 1.25 K/UL (ref 1–4.8)
MCH RBC QN AUTO: 29.4 PG (ref 27–31)
MCHC RBC AUTO-ENTMCNC: 34 G/DL (ref 32–36)
MCV RBC AUTO: 86 FL (ref 82–98)
MICROSCOPIC COMMENT: ABNORMAL
NITRITE UR QL STRIP: NEGATIVE
NUCLEATED RBC (/100WBC) (SMH): 0 /100 WBC
OHS QRS DURATION: 64 MS
OHS QTC CALCULATION: 416 MS
PH UR STRIP: 6 [PH]
PLATELET # BLD AUTO: 309 K/UL (ref 150–450)
PMV BLD AUTO: 10.7 FL (ref 9.2–12.9)
POTASSIUM SERPL-SCNC: 3.1 MMOL/L (ref 3.5–5.1)
PROT SERPL-MCNC: 7.9 GM/DL (ref 6–8.4)
PROT UR QL STRIP: ABNORMAL
RBC # BLD AUTO: 5.07 M/UL (ref 4–5.4)
RBC #/AREA URNS AUTO: 36 /HPF
RELATIVE EOSINOPHIL (SMH): 0.3 % (ref 0–8)
RELATIVE LYMPHOCYTE (SMH): 20.7 % (ref 18–48)
RELATIVE MONOCYTE (SMH): 7.6 % (ref 4–15)
RELATIVE NEUTROPHIL (SMH): 71 % (ref 38–73)
SODIUM SERPL-SCNC: 138 MMOL/L (ref 136–145)
SP GR UR STRIP: 1.02
SQUAMOUS #/AREA URNS AUTO: 33 /HPF
T VAGINALIS GENITAL QL WET PREP: ABNORMAL
UROBILINOGEN UR STRIP-ACNC: NEGATIVE EU/DL
WBC # BLD AUTO: 6.04 K/UL (ref 3.9–12.7)
WBC #/AREA URNS AUTO: >100 /HPF
WBC #/AREA VAG WET PREP: ABNORMAL
YEAST GENITAL QL WET PREP: ABNORMAL

## 2025-06-10 PROCEDURE — 83690 ASSAY OF LIPASE: CPT | Performed by: NURSE PRACTITIONER

## 2025-06-10 PROCEDURE — 93010 ELECTROCARDIOGRAM REPORT: CPT | Mod: ,,, | Performed by: INTERNAL MEDICINE

## 2025-06-10 PROCEDURE — 81001 URINALYSIS AUTO W/SCOPE: CPT | Performed by: NURSE PRACTITIONER

## 2025-06-10 PROCEDURE — 87086 URINE CULTURE/COLONY COUNT: CPT | Performed by: NURSE PRACTITIONER

## 2025-06-10 PROCEDURE — 87210 SMEAR WET MOUNT SALINE/INK: CPT | Performed by: NURSE PRACTITIONER

## 2025-06-10 PROCEDURE — 96361 HYDRATE IV INFUSION ADD-ON: CPT

## 2025-06-10 PROCEDURE — 25000003 PHARM REV CODE 250: Performed by: NURSE PRACTITIONER

## 2025-06-10 PROCEDURE — 85025 COMPLETE CBC W/AUTO DIFF WBC: CPT | Performed by: NURSE PRACTITIONER

## 2025-06-10 PROCEDURE — 86803 HEPATITIS C AB TEST: CPT | Performed by: EMERGENCY MEDICINE

## 2025-06-10 PROCEDURE — 83605 ASSAY OF LACTIC ACID: CPT | Performed by: NURSE PRACTITIONER

## 2025-06-10 PROCEDURE — 84450 TRANSFERASE (AST) (SGOT): CPT | Performed by: NURSE PRACTITIONER

## 2025-06-10 PROCEDURE — 87491 CHLMYD TRACH DNA AMP PROBE: CPT | Performed by: NURSE PRACTITIONER

## 2025-06-10 PROCEDURE — 63600175 PHARM REV CODE 636 W HCPCS: Performed by: NURSE PRACTITIONER

## 2025-06-10 PROCEDURE — 99285 EMERGENCY DEPT VISIT HI MDM: CPT | Mod: 25

## 2025-06-10 PROCEDURE — 25500020 PHARM REV CODE 255

## 2025-06-10 PROCEDURE — 87389 HIV-1 AG W/HIV-1&-2 AB AG IA: CPT | Performed by: EMERGENCY MEDICINE

## 2025-06-10 PROCEDURE — 96375 TX/PRO/DX INJ NEW DRUG ADDON: CPT

## 2025-06-10 PROCEDURE — 81025 URINE PREGNANCY TEST: CPT | Performed by: NURSE PRACTITIONER

## 2025-06-10 PROCEDURE — 96365 THER/PROPH/DIAG IV INF INIT: CPT

## 2025-06-10 PROCEDURE — 36415 COLL VENOUS BLD VENIPUNCTURE: CPT | Performed by: NURSE PRACTITIONER

## 2025-06-10 PROCEDURE — 93005 ELECTROCARDIOGRAM TRACING: CPT

## 2025-06-10 RX ORDER — POTASSIUM CHLORIDE 7.45 MG/ML
10 INJECTION INTRAVENOUS
Status: COMPLETED | OUTPATIENT
Start: 2025-06-10 | End: 2025-06-10

## 2025-06-10 RX ORDER — PANTOPRAZOLE SODIUM 40 MG/10ML
40 INJECTION, POWDER, LYOPHILIZED, FOR SOLUTION INTRAVENOUS
Status: COMPLETED | OUTPATIENT
Start: 2025-06-10 | End: 2025-06-10

## 2025-06-10 RX ORDER — CEFPODOXIME PROXETIL 100 MG/1
100 TABLET, FILM COATED ORAL EVERY 12 HOURS
Qty: 14 TABLET | Refills: 0 | Status: SHIPPED | OUTPATIENT
Start: 2025-06-10 | End: 2025-06-10 | Stop reason: ALTCHOICE

## 2025-06-10 RX ORDER — ONDANSETRON HYDROCHLORIDE 2 MG/ML
4 INJECTION, SOLUTION INTRAVENOUS
Status: COMPLETED | OUTPATIENT
Start: 2025-06-10 | End: 2025-06-10

## 2025-06-10 RX ORDER — CEFTRIAXONE 1 G/1
1 INJECTION, POWDER, FOR SOLUTION INTRAMUSCULAR; INTRAVENOUS
Status: COMPLETED | OUTPATIENT
Start: 2025-06-10 | End: 2025-06-10

## 2025-06-10 RX ORDER — PANTOPRAZOLE SODIUM 40 MG/1
40 TABLET, DELAYED RELEASE ORAL DAILY
Qty: 10 TABLET | Refills: 0 | Status: SHIPPED | OUTPATIENT
Start: 2025-06-10 | End: 2025-06-20

## 2025-06-10 RX ORDER — CEFDINIR 300 MG/1
300 CAPSULE ORAL 2 TIMES DAILY
Qty: 14 CAPSULE | Refills: 0 | Status: SHIPPED | OUTPATIENT
Start: 2025-06-10 | End: 2025-06-17

## 2025-06-10 RX ORDER — ONDANSETRON 4 MG/1
4 TABLET, ORALLY DISINTEGRATING ORAL EVERY 6 HOURS PRN
Qty: 20 TABLET | Refills: 0 | Status: SHIPPED | OUTPATIENT
Start: 2025-06-10

## 2025-06-10 RX ADMIN — CEFTRIAXONE SODIUM 1 G: 1 INJECTION, POWDER, FOR SOLUTION INTRAMUSCULAR; INTRAVENOUS at 01:06

## 2025-06-10 RX ADMIN — ONDANSETRON 4 MG: 2 INJECTION INTRAMUSCULAR; INTRAVENOUS at 10:06

## 2025-06-10 RX ADMIN — PANTOPRAZOLE SODIUM 40 MG: 40 INJECTION, POWDER, FOR SOLUTION INTRAVENOUS at 10:06

## 2025-06-10 RX ADMIN — POTASSIUM CHLORIDE 10 MEQ: 7.46 INJECTION, SOLUTION INTRAVENOUS at 11:06

## 2025-06-10 RX ADMIN — IOHEXOL 75 ML: 350 INJECTION, SOLUTION INTRAVENOUS at 12:06

## 2025-06-10 RX ADMIN — SODIUM CHLORIDE 1000 ML: 9 INJECTION, SOLUTION INTRAVENOUS at 10:06

## 2025-06-10 NOTE — ED PROVIDER NOTES
Encounter Date: 6/10/2025       History     Chief Complaint   Patient presents with    Diarrhea    Nausea    Vomiting     Pt states she has a uti that she just finished the antibiotics. States she was feeling better now she is feeling worse. Pt states she has been vomiting/diarrhea since yesterday hasn't been able to keep anything down.      Patient is a 45 y.o. female who presents to the ED 06/10/2025 with a chief complaint of Nausea vomiting and diarrhea for 3 days.  Patient states recently she has been having more frequent urinary tract infections.  She states her urinary symptoms currently are better and she is not having fever.  She has some back and lower abdominal pain.  She states she is having 3 loose nonbloody stools a day.  She had an episode of vomiting yesterday and has otherwise been nauseous without vomiting.  No hematemesis.  She has had some reflux recently. She is currently having her menstrual cycle and denies pelvic pain or abnormal discharge/bleeding.  She has a history of tubal ligation and oophorectomy and bladder surgery and            Review of patient's allergies indicates:  No Known Allergies  Past Medical History:   Diagnosis Date    Anxiety      Past Surgical History:   Procedure Laterality Date    BLADDER SURGERY      Bladder had ruptured.      SECTION      x3    OOPHORECTOMY      SALPINGOOPHORECTOMY  2012    TUBAL LIGATION  2009     Family History   Problem Relation Name Age of Onset    Lung cancer Paternal Grandfather      Cancer Maternal Grandfather       Social History[1]  Review of Systems   Constitutional:  Negative for chills and fever.   HENT:  Negative for sore throat.    Respiratory:  Negative for chest tightness and shortness of breath.    Cardiovascular:  Negative for chest pain.   Gastrointestinal:  Positive for abdominal pain, diarrhea, nausea and vomiting. Negative for constipation.   Genitourinary:  Positive for flank pain and vaginal  bleeding. Negative for decreased urine volume, dysuria, menstrual problem, pelvic pain, urgency and vaginal discharge.   Musculoskeletal:  Negative for arthralgias, myalgias, neck pain and neck stiffness.   Skin:  Negative for rash and wound.   Neurological:  Negative for syncope.   Hematological:  Does not bruise/bleed easily.       Physical Exam     Initial Vitals [06/10/25 0924]   BP Pulse Resp Temp SpO2   133/83 97 16 97.8 °F (36.6 °C) 98 %      MAP       --         Physical Exam    Nursing note and vitals reviewed.  Constitutional: Vital signs are normal. She appears well-developed and well-nourished.   HENT:   Head: Normocephalic and atraumatic.   Eyes: Pupils are equal, round, and reactive to light.   Neck: Neck supple.   Cardiovascular:  Normal rate, regular rhythm, normal heart sounds and intact distal pulses.     Exam reveals no gallop and no friction rub.       No murmur heard.  Pulmonary/Chest: Breath sounds normal. She has no wheezes. She has no rhonchi. She has no rales.   Abdominal: Abdomen is soft. Bowel sounds are normal. She exhibits no distension and no mass. There is abdominal tenderness in the right lower quadrant and left lower quadrant. There is no rebound and no guarding.   Musculoskeletal:      Cervical back: Neck supple.     Neurological: She is alert and oriented to person, place, and time. She has normal strength.   Skin: Skin is warm, dry and intact. Capillary refill takes less than 2 seconds.   Psychiatric: She has a normal mood and affect. Her speech is normal and behavior is normal.       ED Course   Procedures  Labs Reviewed   COMPREHENSIVE METABOLIC PANEL - Abnormal       Result Value    Sodium 138      Potassium 3.1 (*)     Chloride 103      CO2 23      Glucose 98      BUN 10      Creatinine 0.7      Calcium 8.7      Protein Total 7.9      Albumin 4.1      Bilirubin Total 1.2 (*)           AST 24      ALT 10      Anion Gap 12      eGFR >60     URINALYSIS, REFLEX TO URINE  CULTURE - Abnormal    Color, UA Brown (*)     Appearance, UA Hazy (*)     Spec Grav UA 1.020      pH, UA 6.0      Protein, UA 2+ (*)     Glucose, UA Negative      Ketones, UA 1+ (*)     Blood, UA 3+ (*)     Bilirubin, UA Negative      Urobilinogen, UA Negative      Nitrites, UA Negative      Leukocyte Esterase, UA 3+ (*)    URINALYSIS MICROSCOPIC - Abnormal    RBC, UA 36 (*)     WBC, UA >100 (*)     Bacteria, UA Rare      Squamous Epithelial Cells, UA 33      Microscopic Comment       HEPATITIS C ANTIBODY - Normal    Hep C Ab Interp Non-Reactive     HIV 1 / 2 ANTIBODY - Normal    HIV 1/2 Ag/Ab Non-Reactive     LIPASE - Normal    Lipase Level 12     LACTIC ACID, PLASMA - Normal    Lactic Acid Level 1.6      Narrative:     Falsely low lactic acid results can be found in samples containing >=13.0 mg/dL total bilirubin and/or >=3.5 mg/dL direct bilirubin.    CBC WITH DIFFERENTIAL - Normal    WBC 6.04      RBC 5.07      Hgb 14.9      Hct 43.8      MCV 86      MCH 29.4      MCHC 34.0      RDW 12.8      Platelet Count 309      MPV 10.7      Nucleated RBC 0      Neut % 71.0      Lymph % 20.7      Mono % 7.6      Eos % 0.3      Basophil % 0.2      Imm Grans % 0.2      Neut # 4.3      Lymph # 1.25      Mono # 0.46      Eos # 0.02      Baso # 0.01      Imm Grans # 0.01     CULTURE, URINE   CLOSTRIDIOIDES DIFFICILE   WET PREP, GENITAL   C.TRACH/N.GONOR AMP RNA, VARIES   HEP C VIRUS HOLD SPECIMEN    Extra Tube Hold for add-ons.     HIV VIRUS CONFIRMATION HOLD SPECIMEN    Extra Tube Hold for add-ons.     CBC W/ AUTO DIFFERENTIAL    Narrative:     The following orders were created for panel order CBC W/ AUTO DIFFERENTIAL.  Procedure                               Abnormality         Status                     ---------                               -----------         ------                     CBC with Differential[[id number]]       Normal              Final result                 Please view results for these tests on the  individual orders.   POCT URINE PREGNANCY    POC Preg Test, Ur Negative       Acceptable Yes       EKG Readings: (Independently Interpreted)   Initial Reading: No STEMI. Rhythm: Normal Sinus Rhythm. Heart Rate: 73. Ectopy: No Ectopy. Conduction: Normal. ST Segments: Normal ST Segments. T Waves: Normal. Clinical Impression: Normal Sinus Rhythm Other Impression: No ST elevation or depression       Imaging Results              CT Abdomen Pelvis With IV Contrast NO Oral Contrast (Final result)  Result time 06/10/25 13:20:17      Final result by Ronni Alcocer Jr., MD (06/10/25 13:20:17)                   Impression:      Diverticulosis coli without CT evidence of diverticulitis.  2.3 cm left ovarian cyst.  Otherwise negative CT of the abdomen and pelvis.      Electronically signed by: Ronni Alcocer MD  Date:    06/10/2025  Time:    13:20               Narrative:    EXAMINATION:  CT ABDOMEN PELVIS WITH IV CONTRAST    CLINICAL HISTORY:  Epigastric pain;    TECHNIQUE:  Low dose axial images, sagittal and coronal reformations were obtained from the lung bases to the pubic symphysis following the IV administration of 75 mL of Omnipaque 350    COMPARISON:  CT abdomen pelvis of September 10, 2019.    FINDINGS:  The liver is of normal size contour and CT density.  A focal liver mass is not seen.  The gallbladder is of normal size without CT evidence of stone.  The common bile duct is not dilated.  The pancreas is of normal contour and CT density without edema or mass.  The spleen is of normal size and CT density.    The adrenal glands are not enlarged.  The kidneys are of normal size contour and contrast enhancement without mass stone or hydronephrosis.  The abdominal aorta and inferior vena cava are of normal caliber.  Retroperitoneal adenopathy is not seen.    The stomach is of normal configuration.  Small bowel dilatation or air-fluid levels are not seen.  There is diverticulosis noted in the distal  descending and sigmoid colon without CT evidence of diverticulitis.  The rest of the colon appears of normal configuration.  A normal appendix is seen.  No free fluid or free air is noted.    The bladder is of normal contour without mass or asymmetry.  There is a 2.3 cm left ovarian cyst.  Free fluid in the cul de sac is not seen.                                       Medications   sodium chloride 0.9% bolus 1,000 mL 1,000 mL (0 mLs Intravenous Stopped 6/10/25 1128)   ondansetron injection 4 mg (4 mg Intravenous Given 6/10/25 1018)   pantoprazole injection 40 mg (40 mg Intravenous Given 6/10/25 1018)   potassium chloride 10 mEq in 100 mL IVPB (0 mEq Intravenous Stopped 6/10/25 1225)   iohexoL (OMNIPAQUE 350) 350 mg iodine/mL injection (75 mLs Intravenous Given 6/10/25 1255)   cefTRIAXone injection 1 g (1 g Intravenous Given 6/10/25 1314)     Medical Decision Making  Amount and/or Complexity of Data Reviewed  Labs: ordered.  Radiology: ordered.    Risk  Prescription drug management.                                      Clinical Impression:  Final diagnoses:  [K52.9] Gastroenteritis (Primary)  [K57.90] Diverticulosis  [N83.202] Cyst of left ovary  [N39.0, R31.9] Urinary tract infection with hematuria, site unspecified                         [1]  Social History  Tobacco Use    Smoking status: Former     Current packs/day: 0.50     Average packs/day: 0.5 packs/day for 1 year (0.5 ttl pk-yrs)     Types: Cigarettes    Smokeless tobacco: Never   Substance Use Topics    Alcohol use: Yes     Comment: socially    Drug use: No      Sandra Rodgers NP  06/10/25 [id number]

## 2025-06-10 NOTE — Clinical Note
"Teagan Escudero (Brandi) was seen and treated in our emergency department on 6/10/2025.  She may return to work on 06/12/2025.       If you have any questions or concerns, please don't hesitate to call.      eKnisha Rawls RN    "

## 2025-06-10 NOTE — Clinical Note
"Teagan Navas" Kota was seen and treated in our emergency department on 6/10/2025.  She may return to work on 06/12/2025.       If you have any questions or concerns, please don't hesitate to call.      Kenisha Rawls NP    "

## 2025-06-12 LAB
C TRACH RRNA SPEC QL NAA+PROBE: NORMAL
N GONORRHOEA RRNA SPEC QL NAA+PROBE: NORMAL

## 2025-06-13 LAB — BACTERIA UR CULT: NO GROWTH
